# Patient Record
Sex: FEMALE | Race: WHITE | NOT HISPANIC OR LATINO | Employment: OTHER | URBAN - METROPOLITAN AREA
[De-identification: names, ages, dates, MRNs, and addresses within clinical notes are randomized per-mention and may not be internally consistent; named-entity substitution may affect disease eponyms.]

---

## 2017-02-05 ENCOUNTER — APPOINTMENT (EMERGENCY)
Dept: RADIOLOGY | Facility: HOSPITAL | Age: 37
End: 2017-02-05
Payer: MEDICARE

## 2017-02-05 ENCOUNTER — HOSPITAL ENCOUNTER (EMERGENCY)
Facility: HOSPITAL | Age: 37
Discharge: HOME | End: 2017-02-05
Admitting: EMERGENCY MEDICINE
Payer: MEDICARE

## 2017-02-05 VITALS
WEIGHT: 180 LBS | DIASTOLIC BLOOD PRESSURE: 62 MMHG | TEMPERATURE: 97 F | SYSTOLIC BLOOD PRESSURE: 132 MMHG | BODY MASS INDEX: 35.34 KG/M2 | HEART RATE: 88 BPM | HEIGHT: 60 IN | RESPIRATION RATE: 16 BRPM | OXYGEN SATURATION: 99 %

## 2017-02-05 DIAGNOSIS — B34.9 VIRAL ILLNESS: ICD-10-CM

## 2017-02-05 DIAGNOSIS — R05.9 COUGH: Primary | ICD-10-CM

## 2017-02-05 DIAGNOSIS — S29.019A ACUTE THORACIC MYOFASCIAL STRAIN: ICD-10-CM

## 2017-02-05 PROCEDURE — 71020 HB CHEST X-RAY 2VW FRONTAL&LATL: CPT

## 2017-02-05 PROCEDURE — 99283 EMERGENCY DEPT VISIT LOW MDM: CPT

## 2017-06-06 ENCOUNTER — TRANSCRIBE ORDERS (OUTPATIENT)
Dept: ADMINISTRATIVE | Facility: HOSPITAL | Age: 37
End: 2017-06-06

## 2017-06-06 DIAGNOSIS — M54.16 LUMBAR RADICULOPATHY: Primary | ICD-10-CM

## 2017-06-09 ENCOUNTER — HOSPITAL ENCOUNTER (OUTPATIENT)
Dept: CT IMAGING | Facility: CLINIC | Age: 37
Discharge: HOME | End: 2017-06-09
Payer: MEDICARE

## 2017-06-09 DIAGNOSIS — M54.16 LUMBAR RADICULOPATHY: ICD-10-CM

## 2017-06-09 PROCEDURE — 72131 CT LUMBAR SPINE W/O DYE: CPT

## 2017-07-22 ENCOUNTER — ANESTHESIA (INPATIENT)
Dept: PERIOP | Facility: HOSPITAL | Age: 37
DRG: 777 | End: 2017-07-22
Payer: MEDICARE

## 2017-07-22 ENCOUNTER — HOSPITAL ENCOUNTER (INPATIENT)
Facility: HOSPITAL | Age: 37
LOS: 1 days | Discharge: HOME | DRG: 777 | End: 2017-07-23
Attending: OBSTETRICS & GYNECOLOGY | Admitting: OBSTETRICS & GYNECOLOGY
Payer: MEDICARE

## 2017-07-22 ENCOUNTER — APPOINTMENT (EMERGENCY)
Dept: ULTRASOUND IMAGING | Facility: HOSPITAL | Age: 37
DRG: 777 | End: 2017-07-22
Payer: MEDICARE

## 2017-07-22 ENCOUNTER — ANESTHESIA EVENT (INPATIENT)
Dept: PERIOP | Facility: HOSPITAL | Age: 37
DRG: 777 | End: 2017-07-22
Payer: MEDICARE

## 2017-07-22 DIAGNOSIS — O00.90 ECTOPIC PREGNANCY: Primary | ICD-10-CM

## 2017-07-22 LAB
ABO GROUP BLD: NORMAL
ALBUMIN SERPL BCP-MCNC: 3.3 G/DL (ref 3.5–5)
ALP SERPL-CCNC: 67 U/L (ref 46–116)
ALT SERPL W P-5'-P-CCNC: 24 U/L (ref 12–78)
ANION GAP SERPL CALCULATED.3IONS-SCNC: 8 MMOL/L (ref 4–13)
AST SERPL W P-5'-P-CCNC: 17 U/L (ref 5–45)
B-HCG SERPL-ACNC: ABNORMAL MIU/ML
BASOPHILS # BLD AUTO: 0.02 THOUSANDS/ΜL (ref 0–0.1)
BASOPHILS NFR BLD AUTO: 0 % (ref 0–1)
BILIRUB SERPL-MCNC: 0.4 MG/DL (ref 0.2–1)
BUN SERPL-MCNC: 10 MG/DL (ref 5–25)
CALCIUM SERPL-MCNC: 8.6 MG/DL (ref 8.3–10.1)
CHLORIDE SERPL-SCNC: 104 MMOL/L (ref 100–108)
CO2 SERPL-SCNC: 27 MMOL/L (ref 21–32)
CREAT SERPL-MCNC: 0.88 MG/DL (ref 0.6–1.3)
EOSINOPHIL # BLD AUTO: 0 THOUSAND/ΜL (ref 0–0.61)
EOSINOPHIL NFR BLD AUTO: 0 % (ref 0–6)
ERYTHROCYTE [DISTWIDTH] IN BLOOD BY AUTOMATED COUNT: 11.7 % (ref 11.6–15.1)
GFR SERPL CREATININE-BSD FRML MDRD: >60 ML/MIN/1.73SQ M
GLUCOSE SERPL-MCNC: 108 MG/DL (ref 65–140)
HCT VFR BLD AUTO: 36.5 % (ref 34.8–46.1)
HGB BLD-MCNC: 13.1 G/DL (ref 11.5–15.4)
LYMPHOCYTES # BLD AUTO: 1.08 THOUSANDS/ΜL (ref 0.6–4.47)
LYMPHOCYTES NFR BLD AUTO: 6 % (ref 14–44)
MCH RBC QN AUTO: 33 PG (ref 26.8–34.3)
MCHC RBC AUTO-ENTMCNC: 35.9 G/DL (ref 31.4–37.4)
MCV RBC AUTO: 92 FL (ref 82–98)
MONOCYTES # BLD AUTO: 1.42 THOUSAND/ΜL (ref 0.17–1.22)
MONOCYTES NFR BLD AUTO: 8 % (ref 4–12)
NEUTROPHILS # BLD AUTO: 15.62 THOUSANDS/ΜL (ref 1.85–7.62)
NEUTS SEG NFR BLD AUTO: 86 % (ref 43–75)
PLATELET # BLD AUTO: 238 THOUSANDS/UL (ref 149–390)
PMV BLD AUTO: 8.8 FL (ref 8.9–12.7)
POTASSIUM SERPL-SCNC: 3.7 MMOL/L (ref 3.5–5.3)
PROT SERPL-MCNC: 7.1 G/DL (ref 6.4–8.2)
RBC # BLD AUTO: 3.97 MILLION/UL (ref 3.81–5.12)
RH BLD: POSITIVE
SODIUM SERPL-SCNC: 139 MMOL/L (ref 136–145)
WBC # BLD AUTO: 18.14 THOUSAND/UL (ref 4.31–10.16)

## 2017-07-22 PROCEDURE — 87491 CHLMYD TRACH DNA AMP PROBE: CPT | Performed by: PHYSICIAN ASSISTANT

## 2017-07-22 PROCEDURE — 76801 OB US < 14 WKS SINGLE FETUS: CPT

## 2017-07-22 PROCEDURE — 96361 HYDRATE IV INFUSION ADD-ON: CPT

## 2017-07-22 PROCEDURE — 84702 CHORIONIC GONADOTROPIN TEST: CPT | Performed by: PHYSICIAN ASSISTANT

## 2017-07-22 PROCEDURE — 87070 CULTURE OTHR SPECIMN AEROBIC: CPT | Performed by: PHYSICIAN ASSISTANT

## 2017-07-22 PROCEDURE — 10T24ZZ RESECTION OF PRODUCTS OF CONCEPTION, ECTOPIC, PERCUTANEOUS ENDOSCOPIC APPROACH: ICD-10-PCS | Performed by: OBSTETRICS & GYNECOLOGY

## 2017-07-22 PROCEDURE — 96374 THER/PROPH/DIAG INJ IV PUSH: CPT

## 2017-07-22 PROCEDURE — 86901 BLOOD TYPING SEROLOGIC RH(D): CPT | Performed by: PHYSICIAN ASSISTANT

## 2017-07-22 PROCEDURE — 86901 BLOOD TYPING SEROLOGIC RH(D): CPT | Performed by: EMERGENCY MEDICINE

## 2017-07-22 PROCEDURE — 86920 COMPATIBILITY TEST SPIN: CPT

## 2017-07-22 PROCEDURE — 86900 BLOOD TYPING SEROLOGIC ABO: CPT | Performed by: PHYSICIAN ASSISTANT

## 2017-07-22 PROCEDURE — 87147 CULTURE TYPE IMMUNOLOGIC: CPT | Performed by: PHYSICIAN ASSISTANT

## 2017-07-22 PROCEDURE — 36415 COLL VENOUS BLD VENIPUNCTURE: CPT | Performed by: PHYSICIAN ASSISTANT

## 2017-07-22 PROCEDURE — 99285 EMERGENCY DEPT VISIT HI MDM: CPT

## 2017-07-22 PROCEDURE — 86850 RBC ANTIBODY SCREEN: CPT | Performed by: EMERGENCY MEDICINE

## 2017-07-22 PROCEDURE — 85025 COMPLETE CBC W/AUTO DIFF WBC: CPT | Performed by: PHYSICIAN ASSISTANT

## 2017-07-22 PROCEDURE — 80053 COMPREHEN METABOLIC PANEL: CPT | Performed by: PHYSICIAN ASSISTANT

## 2017-07-22 PROCEDURE — 0UT74ZZ RESECTION OF BILATERAL FALLOPIAN TUBES, PERCUTANEOUS ENDOSCOPIC APPROACH: ICD-10-PCS | Performed by: OBSTETRICS & GYNECOLOGY

## 2017-07-22 PROCEDURE — 87591 N.GONORRHOEAE DNA AMP PROB: CPT | Performed by: PHYSICIAN ASSISTANT

## 2017-07-22 PROCEDURE — 86900 BLOOD TYPING SEROLOGIC ABO: CPT | Performed by: EMERGENCY MEDICINE

## 2017-07-22 RX ORDER — FENTANYL CITRATE 50 UG/ML
INJECTION, SOLUTION INTRAMUSCULAR; INTRAVENOUS AS NEEDED
Status: DISCONTINUED | OUTPATIENT
Start: 2017-07-22 | End: 2017-07-23 | Stop reason: SURG

## 2017-07-22 RX ORDER — ACETAMINOPHEN 500 MG
1000 TABLET ORAL EVERY 6 HOURS PRN
COMMUNITY
End: 2019-02-06 | Stop reason: ALTCHOICE

## 2017-07-22 RX ORDER — MORPHINE SULFATE 4 MG/ML
4 INJECTION, SOLUTION INTRAMUSCULAR; INTRAVENOUS ONCE
Status: COMPLETED | OUTPATIENT
Start: 2017-07-22 | End: 2017-07-22

## 2017-07-22 RX ORDER — ROCURONIUM BROMIDE 10 MG/ML
INJECTION, SOLUTION INTRAVENOUS AS NEEDED
Status: DISCONTINUED | OUTPATIENT
Start: 2017-07-22 | End: 2017-07-23 | Stop reason: SURG

## 2017-07-22 RX ORDER — ONDANSETRON 2 MG/ML
INJECTION INTRAMUSCULAR; INTRAVENOUS AS NEEDED
Status: DISCONTINUED | OUTPATIENT
Start: 2017-07-22 | End: 2017-07-23 | Stop reason: SURG

## 2017-07-22 RX ORDER — SODIUM CHLORIDE, SODIUM LACTATE, POTASSIUM CHLORIDE, CALCIUM CHLORIDE 600; 310; 30; 20 MG/100ML; MG/100ML; MG/100ML; MG/100ML
INJECTION, SOLUTION INTRAVENOUS CONTINUOUS PRN
Status: DISCONTINUED | OUTPATIENT
Start: 2017-07-22 | End: 2017-07-23 | Stop reason: SURG

## 2017-07-22 RX ORDER — FENTANYL CITRATE 50 UG/ML
INJECTION, SOLUTION INTRAMUSCULAR; INTRAVENOUS AS NEEDED
Status: DISCONTINUED | OUTPATIENT
Start: 2017-07-22 | End: 2017-07-22

## 2017-07-22 RX ORDER — ACETAMINOPHEN 325 MG/1
650 TABLET ORAL ONCE
Status: DISCONTINUED | OUTPATIENT
Start: 2017-07-22 | End: 2017-07-23

## 2017-07-22 RX ORDER — PROPOFOL 10 MG/ML
INJECTION, EMULSION INTRAVENOUS AS NEEDED
Status: DISCONTINUED | OUTPATIENT
Start: 2017-07-22 | End: 2017-07-23 | Stop reason: SURG

## 2017-07-22 RX ORDER — MIDAZOLAM HYDROCHLORIDE 1 MG/ML
INJECTION INTRAMUSCULAR; INTRAVENOUS AS NEEDED
Status: DISCONTINUED | OUTPATIENT
Start: 2017-07-22 | End: 2017-07-23 | Stop reason: SURG

## 2017-07-22 RX ADMIN — FENTANYL CITRATE 50 MCG: 50 INJECTION, SOLUTION INTRAMUSCULAR; INTRAVENOUS at 23:13

## 2017-07-22 RX ADMIN — SODIUM CHLORIDE 1000 ML: 0.9 INJECTION, SOLUTION INTRAVENOUS at 21:30

## 2017-07-22 RX ADMIN — MIDAZOLAM HYDROCHLORIDE 1 MG: 1 INJECTION, SOLUTION INTRAMUSCULAR; INTRAVENOUS at 23:13

## 2017-07-22 RX ADMIN — ROCURONIUM BROMIDE 35 MG: 10 INJECTION, SOLUTION INTRAVENOUS at 23:29

## 2017-07-22 RX ADMIN — FENTANYL CITRATE 50 MCG: 50 INJECTION, SOLUTION INTRAMUSCULAR; INTRAVENOUS at 23:26

## 2017-07-22 RX ADMIN — PROPOFOL 200 MG: 10 INJECTION, EMULSION INTRAVENOUS at 23:32

## 2017-07-22 RX ADMIN — MIDAZOLAM HYDROCHLORIDE 1 MG: 1 INJECTION, SOLUTION INTRAMUSCULAR; INTRAVENOUS at 23:26

## 2017-07-22 RX ADMIN — ONDANSETRON 4 MG: 2 INJECTION INTRAMUSCULAR; INTRAVENOUS at 23:52

## 2017-07-22 RX ADMIN — SODIUM CHLORIDE 1000 ML: 0.9 INJECTION, SOLUTION INTRAVENOUS at 20:04

## 2017-07-22 RX ADMIN — SODIUM CHLORIDE, SODIUM LACTATE, POTASSIUM CHLORIDE, AND CALCIUM CHLORIDE: .6; .31; .03; .02 INJECTION, SOLUTION INTRAVENOUS at 23:12

## 2017-07-22 RX ADMIN — MORPHINE SULFATE 4 MG: 4 INJECTION, SOLUTION INTRAMUSCULAR; INTRAVENOUS at 21:27

## 2017-07-23 VITALS
BODY MASS INDEX: 42.33 KG/M2 | OXYGEN SATURATION: 100 % | RESPIRATION RATE: 18 BRPM | WEIGHT: 215.61 LBS | SYSTOLIC BLOOD PRESSURE: 95 MMHG | HEART RATE: 73 BPM | DIASTOLIC BLOOD PRESSURE: 53 MMHG | HEIGHT: 60 IN | TEMPERATURE: 98 F

## 2017-07-23 LAB
ABO GROUP BLD: NORMAL
BLD GP AB SCN SERPL QL: NEGATIVE
RH BLD: POSITIVE
SPECIMEN EXPIRATION DATE: NORMAL

## 2017-07-23 PROCEDURE — 88342 IMHCHEM/IMCYTCHM 1ST ANTB: CPT | Performed by: OBSTETRICS & GYNECOLOGY

## 2017-07-23 PROCEDURE — 88305 TISSUE EXAM BY PATHOLOGIST: CPT | Performed by: OBSTETRICS & GYNECOLOGY

## 2017-07-23 RX ORDER — OXYCODONE HYDROCHLORIDE AND ACETAMINOPHEN 5; 325 MG/1; MG/1
1 TABLET ORAL EVERY 4 HOURS PRN
Qty: 30 TABLET | Refills: 0 | Status: SHIPPED | OUTPATIENT
Start: 2017-07-23 | End: 2017-07-28

## 2017-07-23 RX ORDER — GLYCOPYRROLATE 0.2 MG/ML
INJECTION INTRAMUSCULAR; INTRAVENOUS AS NEEDED
Status: DISCONTINUED | OUTPATIENT
Start: 2017-07-23 | End: 2017-07-23 | Stop reason: SURG

## 2017-07-23 RX ORDER — OXYCODONE HYDROCHLORIDE AND ACETAMINOPHEN 5; 325 MG/1; MG/1
1 TABLET ORAL EVERY 4 HOURS PRN
Status: DISCONTINUED | OUTPATIENT
Start: 2017-07-23 | End: 2017-07-23

## 2017-07-23 RX ORDER — SODIUM CHLORIDE 9 MG/ML
INJECTION, SOLUTION INTRAVENOUS AS NEEDED
Status: DISCONTINUED | OUTPATIENT
Start: 2017-07-22 | End: 2017-07-23 | Stop reason: HOSPADM

## 2017-07-23 RX ORDER — FENTANYL CITRATE/PF 50 MCG/ML
25 SYRINGE (ML) INJECTION
Status: DISCONTINUED | OUTPATIENT
Start: 2017-07-23 | End: 2017-07-23 | Stop reason: HOSPADM

## 2017-07-23 RX ORDER — METOCLOPRAMIDE HYDROCHLORIDE 5 MG/ML
10 INJECTION INTRAMUSCULAR; INTRAVENOUS ONCE AS NEEDED
Status: DISCONTINUED | OUTPATIENT
Start: 2017-07-23 | End: 2017-07-23 | Stop reason: HOSPADM

## 2017-07-23 RX ADMIN — HYDROMORPHONE HYDROCHLORIDE 0.5 MG: 1 INJECTION, SOLUTION INTRAMUSCULAR; INTRAVENOUS; SUBCUTANEOUS at 01:24

## 2017-07-23 RX ADMIN — HYDROMORPHONE HYDROCHLORIDE 0.5 MG: 1 INJECTION, SOLUTION INTRAMUSCULAR; INTRAVENOUS; SUBCUTANEOUS at 01:04

## 2017-07-23 RX ADMIN — GLYCOPYRROLATE 0.6 MG: 0.2 INJECTION, SOLUTION INTRAMUSCULAR; INTRAVENOUS at 00:30

## 2017-07-23 RX ADMIN — FENTANYL CITRATE 25 MCG: 50 INJECTION, SOLUTION INTRAMUSCULAR; INTRAVENOUS at 01:20

## 2017-07-23 RX ADMIN — NEOSTIGMINE METHYLSULFATE 3 MG: 1 INJECTION, SOLUTION INTRAMUSCULAR; INTRAVENOUS; SUBCUTANEOUS at 00:30

## 2017-07-23 RX ADMIN — OXYCODONE HYDROCHLORIDE AND ACETAMINOPHEN 1 TABLET: 5; 325 TABLET ORAL at 04:35

## 2017-07-23 RX ADMIN — FENTANYL CITRATE 25 MCG: 50 INJECTION, SOLUTION INTRAMUSCULAR; INTRAVENOUS at 01:13

## 2017-07-23 RX ADMIN — HYDROMORPHONE HYDROCHLORIDE 0.5 MG: 1 INJECTION, SOLUTION INTRAMUSCULAR; INTRAVENOUS; SUBCUTANEOUS at 01:10

## 2017-07-23 RX ADMIN — FENTANYL CITRATE 25 MCG: 50 INJECTION, SOLUTION INTRAMUSCULAR; INTRAVENOUS at 00:58

## 2017-07-23 RX ADMIN — SODIUM CHLORIDE, SODIUM LACTATE, POTASSIUM CHLORIDE, AND CALCIUM CHLORIDE: .6; .31; .03; .02 INJECTION, SOLUTION INTRAVENOUS at 00:48

## 2017-07-23 RX ADMIN — FENTANYL CITRATE 25 MCG: 50 INJECTION, SOLUTION INTRAMUSCULAR; INTRAVENOUS at 00:53

## 2017-07-24 LAB
ABO GROUP BLD BPU: NORMAL
ABO GROUP BLD BPU: NORMAL
BPU ID: NORMAL
BPU ID: NORMAL
CHLAMYDIA DNA CVX QL NAA+PROBE: NORMAL
CROSSMATCH: NORMAL
CROSSMATCH: NORMAL
N GONORRHOEA DNA GENITAL QL NAA+PROBE: NORMAL
UNIT DISPENSE STATUS: NORMAL
UNIT DISPENSE STATUS: NORMAL
UNIT PRODUCT CODE: NORMAL
UNIT PRODUCT CODE: NORMAL
UNIT RH: NORMAL
UNIT RH: NORMAL

## 2017-07-25 LAB
BACTERIA GENITAL AEROBE CULT: NORMAL
BACTERIA GENITAL AEROBE CULT: NORMAL

## 2017-08-03 ENCOUNTER — ALLSCRIPTS OFFICE VISIT (OUTPATIENT)
Dept: OTHER | Facility: OTHER | Age: 37
End: 2017-08-03

## 2018-01-10 NOTE — MISCELLANEOUS
Message     Date: 25 Oct 2016 12:31 PM EST, Recorded By: Nixon Nguyen For: Alexis Stern: Dustin Lopez, Self   Phone: [phone] (Home), [phone] x,,,,, (Work)   Reason: Medical Complaint   Patient called c/o bleeding on and off x 1 week with clots and cramps, pelvic pain  Taking Percocet for back pain  Per Dr Eagle Marrero, could be from ruptured ovarian cyst   If ok with pain Md take Motrin instead  Schedule appt for u/s and ov  Pt cannot come in until 11/2/16  Active Problems    1  Anterior pleuritic pain (786 52) (R07 81)   2  Anxiety disorder (300 00) (F41 9)   3  Breast mass (611 72) (N63)   4  Breast pain (611 71) (N64 4)   5  Cervical cancer screening (V76 2) (Z12 4)   6  Cervicalgia (723 1) (M54 2)   7  Chest pain (786 50) (R07 9)   8  Chronic pain syndrome (338 4) (G89 4)   9  Contraceptive surveillance (V25 40) (Z30 40)   10  Contraceptives (V25 02)   11  Cyst of right ovary (620 2) (N83 201)   12  Depo-Provera contraceptive status (V25 49) (Z30 40)   13  History of Depo-Provera contraceptive status (V25 49) (Z30 40)   14  Disc degeneration, lumbar (722 52) (M51 36)   15  Fatigue (780 79) (R53 83)   16  Female pelvic pain (625 9) (R10 2)   17  Hemoptysis (786 30) (R04 2)   18  History of self breast exam   19  Irregular menses (626 4) (N92 6)   20  LLQ pain (789 04) (R10 32)   21  Lower back pain (724 2) (M54 5)   22  Lumbar canal stenosis (724 02) (M48 06)   23  Lumbar radiculopathy (724 4) (M54 16)   24  Myalgia And Myositis (729 1)   25  Postlaminectomy syndrome, lumbar (722 83) (M96 1)   26  Spinal stenosis (724 00) (M48 00)   27  Thoracic back pain (724 1) (M54 6)   28  Visit for routine gyn exam (V72 31) (Z01 419)    Current Meds   1  ClonazePAM 1 MG Oral Tablet; TAKE 1 TABLET TWICE DAILY AS NEEDED; Therapy: 66VZE7608 to (Evaluate:22Nov2014); Last Rx:85Rpw4173 Ordered   2  Fioricet -40 MG TABS (Butalbital-APAP-Caffeine);    Therapy: (Recorded:08Apr2016) to Recorded   3  Hydrocodone-Acetaminophen 5-325 MG Oral Tablet; Therapy: 64FCY7231 to Recorded   4  MedroxyPROGESTERone Acetate 150 MG/ML Intramuscular Suspension   (Depo-Provera); INJECT EVERY 12 WEEKS AS DIRECTED; Therapy: 00IID2375-  Requested for: 07ALW0593; Last Rx:08Mar2016; Status:   NEED INFORMATION - Billable Problem Ordered   5  MedroxyPROGESTERone Acetate 150 MG/ML Intramuscular Suspension; INJECT   INTRAMUSCULARLY EVERY 12 WEEKS AS DIRECTED; Therapy: 49MID9148 to (Last Rx:06Jun2016)  Requested for: 06Jun2016   Ordered    Allergies    1  Aztreonam   2  Carbapenems   3  Cephalosporins   4   Griseofulvin    Signatures   Electronically signed by : Rafael Robles, ; Oct 25 2016 12:34PM EST                       (Author)

## 2018-01-11 NOTE — MISCELLANEOUS
Message   Recorded as Task   Date: 06/09/2016 10:21 PM, Created By: Valentin Onofre   Task Name: Go to Result   Assigned To: Valentin Onofre   Regarding Patient: LINNEA HUNTER, Status: Active   Comment:    Valentin Onofre - 09 Jun 2016 10:21 PM     TASK CREATED  cyst still present, would recheck in 3 months   Rhona Mills - 10 Randall 2016 7:37 AM     TASK REPLIED TO: Previously Assigned To Brittany Thurman  sent pt a new order for 3 month follow-up        Active Problems    1  Anterior pleuritic pain (786 52) (R07 81)   2  Anxiety disorder (300 00) (F41 9)   3  Breast mass (611 72) (N63)   4  Breast pain (611 71) (N64 4)   5  Cervical cancer screening (V76 2) (Z12 4)   6  Cervicalgia (723 1) (M54 2)   7  Chest pain (786 50) (R07 9)   8  Chronic pain syndrome (338 4) (G89 4)   9  Contraceptive surveillance (V25 40) (Z30 40)   10  Contraceptives (V25 02)   11  Cyst of right ovary (620 2) (N83 20)   12  Depo-Provera contraceptive status (V25 49) (Z30 42)   13  History of Depo-Provera contraceptive status (V25 49) (Z30 42)   14  Disc degeneration, lumbar (722 52) (M51 36)   15  Fatigue (780 79) (R53 83)   16  Female pelvic pain (625 9) (R10 2)   17  Hemoptysis (786 30) (R04 2)   18  History of self breast exam   19  Irregular menses (626 4) (N92 6)   20  LLQ pain (789 04) (R10 32)   21  Lower back pain (724 2) (M54 5)   22  Lumbar canal stenosis (724 02) (M48 06)   23  Lumbar radiculopathy (724 4) (M54 16)   24  Myalgia And Myositis (729 1)   25  Postlaminectomy syndrome, lumbar (722 83) (M96 1)   26  Spinal stenosis (724 00) (M48 00)   27  Thoracic back pain (724 1) (M54 6)   28  Visit for routine gyn exam (V72 31) (Z01 419)    Current Meds   1  ALPRAZolam 0 25 MG Oral Tablet; Therapy: 45RUS0105 to Recorded   2  ClonazePAM 1 MG Oral Tablet; TAKE 1 TABLET TWICE DAILY AS NEEDED; Therapy: 74RNW9907 to (Evaluate:22Nov2014); Last Rx:78Uuz8351 Ordered   3  Diclofenac Potassium 50 MG Oral Tablet;    Therapy: 30YYU4905 to Recorded   4  Doxepin HCl - 10 MG Oral Capsule; Therapy: 21BTZ5492 to Recorded   5  Fioricet -40 MG TABS (APAP-Butalbital-Caffeine); Therapy: (Recorded:08Apr2016) to Recorded   6  Flexeril 5 MG TABS (Cyclobenzaprine HCl); Therapy: (Recorded:30Mar2015) to Recorded   7  Hydrocodone-Acetaminophen 5-325 MG Oral Tablet; Therapy: 59MYS6269 to Recorded   8  MedroxyPROGESTERone Acetate 150 MG/ML Intramuscular Suspension   (Depo-Provera); INJECT EVERY 12 WEEKS AS DIRECTED; Therapy: 86KZK8077-  Requested for: 04LBZ3839; Last Rx:08Mar2016; Status:   NEED INFORMATION - Billable Problem Ordered   9  MedroxyPROGESTERone Acetate 150 MG/ML Intramuscular Suspension; INJECT   INTRAMUSCULARLY EVERY 12 WEEKS AS DIRECTED; Therapy: 09DVF6673 to (Last Rx:06Jun2016)  Requested for: 06Jun2016   Ordered   10  Morphine Sulfate TABS; Therapy: (Recorded:08Mar2016) to Recorded    Allergies    1  Aztreonam   2  Carbapenems   3  Cephalosporins   4  Griseofulvin    Plan  Cyst of right ovary    · * US PELVIS COMPLETE (TRANSABDOMINAL AND TRANSVAGINAL); Status:Hold For -  [employer];  Requested for:48Nbv7938;     Signatures   Electronically signed by : Tosha Blackmon, ; Randall 10 2016  7:38AM EST                       (Author)

## 2018-01-14 VITALS
SYSTOLIC BLOOD PRESSURE: 122 MMHG | WEIGHT: 194.38 LBS | DIASTOLIC BLOOD PRESSURE: 84 MMHG | BODY MASS INDEX: 38.16 KG/M2 | HEIGHT: 60 IN

## 2018-01-14 NOTE — MISCELLANEOUS
Message   Recorded as Task   Date: 04/07/2016 02:47 PM, Created By: Kike Coon   Task Name: Go to Result   Assigned To: Danielle Presume   Regarding Patient: LINNEA HUNTER, Status: In Progress   Comment:    Kike Coon - 07 Apr 2016 2:47 PM     TASK CREATED  US shows a cyst on the right side, next to the ovary, left side and uterus are normal   rec repeat US in 8-12 weeks   Rhona Mills - 07 Apr 2016 4:28 PM     TASK IN PROGRESS    Pt informed  Order mailed for follow up  Active Problems    1  Anxiety disorder (300 00) (F41 9)   2  Breast mass (611 72) (N63)   3  Breast pain (611 71) (N64 4)   4  Cervical cancer screening (V76 2) (Z12 4)   5  Cervicalgia (723 1) (M54 2)   6  Chest pain (786 50) (R07 9)   7  Chronic pain syndrome (338 4) (G89 4)   8  Contraceptive surveillance (V25 40) (Z30 40)   9  Contraceptives (V25 02)   10  Cyst of right ovary (620 2) (N83 20)   11  Depo-Provera contraceptive status (V25 49) (Z30 42)   12  History of Depo-Provera contraceptive status (V25 49) (Z30 42)   13  Disc degeneration, lumbar (722 52) (M51 36)   14  Fatigue (780 79) (R53 83)   15  Female pelvic pain (625 9) (R10 2)   16  History of self breast exam   17  Irregular menses (626 4) (N92 6)   18  LLQ pain (789 04) (R10 32)   19  Lower back pain (724 2) (M54 5)   20  Lumbar canal stenosis (724 02) (M48 06)   21  Lumbar radiculopathy (724 4) (M54 16)   22  Myalgia And Myositis (729 1)   23  Postlaminectomy syndrome, lumbar (722 83) (M96 1)   24  Spinal stenosis (724 00) (M48 00)   25  Thoracic back pain (724 1) (M54 6)   26  Visit for routine gyn exam (V72 31) (Z01 419)    Current Meds   1  ALPRAZolam 0 25 MG Oral Tablet; Therapy: 78HLX5056 to Recorded   2  ClonazePAM 1 MG Oral Tablet; TAKE 1 TABLET TWICE DAILY AS NEEDED; Therapy: 69ERT7397 to (Evaluate:22Nov2014); Last Rx:45Thl8423 Ordered   3  Diclofenac Potassium 50 MG Oral Tablet; Therapy: 89FEG1134 to Recorded   4   Doxepin HCl - 10 MG Oral Capsule; Therapy: 63YFI1842 to Recorded   5  Flexeril 5 MG TABS (Cyclobenzaprine HCl); Therapy: (Recorded:30Mar2015) to Recorded   6  Hydrocodone-Acetaminophen 5-325 MG Oral Tablet; Therapy: 65KLX8237 to Recorded   7  MedroxyPROGESTERone Acetate 150 MG/ML Intramuscular Suspension   (Depo-Provera); INJECT EVERY 12 WEEKS AS DIRECTED; Therapy: 39GTO1211-  Requested for: 54VFT3094; Last Rx:08Mar2016; Status:   NEED INFORMATION - Billable Problem Ordered   8  Morphine Sulfate TABS; Therapy: (Recorded:08Mar2016) to Recorded    Allergies    1  Aztreonam   2  Carbapenems   3  Cephalosporins   4  Griseofulvin    Plan  Cyst of right ovary    · US PELVIS COMPLETE NON OB; Status:Hold For - Scheduling,Retrospective  Authorization;  Requested for:08Apr2016;     Signatures   Electronically signed by : Hira Melgar, ; Apr 8 2016  8:21AM EST                       (Author)

## 2018-01-16 NOTE — MISCELLANEOUS
Message   Recorded as Task   Date: 08/18/2016 03:17 PM, Created By: Sabianist Shannanramos   Task Name: Go to Result   Assigned To: Marychuy Urrutia   Regarding Patient: LINNEA HUNTER, Status: In Progress   Comment:    [employer] - 18 Aug 2016 3:17 PM     TASK CREATED  slightly smaller right ovarian cyst/nodule, would recheck with US in 3-4 months   Rhona Mills - 18 Aug 2016 3:37 PM     TASK IN PROGRESS   Rhona Mills - 18 Aug 2016 3:45 PM     TASK REPLIED TO: Previously Assigned To Marychuy Urrutia  pt does have pain with the cyst and it causes her discomfort and it gets like a #10 on the scale of pain    she wants to know what to do? [employer] - 19 Aug 2016 1:22 PM     TASK REPLIED TO: Previously Assigned To Sabianist Fuelramos  her cyst is very small, not sure this is the cause of her pain, she can come in for evaluation        Active Problems    1  Anterior pleuritic pain (786 52) (R07 81)   2  Anxiety disorder (300 00) (F41 9)   3  Breast mass (611 72) (N63)   4  Breast pain (611 71) (N64 4)   5  Cervical cancer screening (V76 2) (Z12 4)   6  Cervicalgia (723 1) (M54 2)   7  Chest pain (786 50) (R07 9)   8  Chronic pain syndrome (338 4) (G89 4)   9  Contraceptive surveillance (V25 40) (Z30 40)   10  Contraceptives (V25 02)   11  Cyst of right ovary (620 2) (N83 20)   12  Depo-Provera contraceptive status (V25 49) (Z30 42)   13  History of Depo-Provera contraceptive status (V25 49) (Z30 42)   14  Disc degeneration, lumbar (722 52) (M51 36)   15  Fatigue (780 79) (R53 83)   16  Female pelvic pain (625 9) (R10 2)   17  Hemoptysis (786 30) (R04 2)   18  History of self breast exam   19  Irregular menses (626 4) (N92 6)   20  LLQ pain (789 04) (R10 32)   21  Lower back pain (724 2) (M54 5)   22  Lumbar canal stenosis (724 02) (M48 06)   23  Lumbar radiculopathy (724 4) (M54 16)   24  Myalgia And Myositis (729 1)   25  Postlaminectomy syndrome, lumbar (722 83) (M96 1)   26   Spinal stenosis (724 00) (M48 00)   27  Thoracic back pain (724 1) (M54 6)   28  Visit for routine gyn exam (V72 31) (Z01 419)    Current Meds   1  ClonazePAM 1 MG Oral Tablet; TAKE 1 TABLET TWICE DAILY AS NEEDED; Therapy: 84EZB1656 to (Evaluate:22Nov2014); Last Rx:58Kap2444 Ordered   2  Fioricet -40 MG TABS (Butalbital-APAP-Caffeine); Therapy: (Recorded:08Apr2016) to Recorded   3  Hydrocodone-Acetaminophen 5-325 MG Oral Tablet; Therapy: 23AFG4878 to Recorded   4  MedroxyPROGESTERone Acetate 150 MG/ML Intramuscular Suspension   (Depo-Provera); INJECT EVERY 12 WEEKS AS DIRECTED; Therapy: 74EMW4974-  Requested for: 18WLN4456; Last Rx:08Mar2016; Status:   NEED INFORMATION - Billable Problem Ordered   5  MedroxyPROGESTERone Acetate 150 MG/ML Intramuscular Suspension; INJECT   INTRAMUSCULARLY EVERY 12 WEEKS AS DIRECTED; Therapy: 90EKI4916 to (Last Rx:06Jun2016)  Requested for: 06Jun2016   Ordered    Allergies    1  Aztreonam   2  Carbapenems   3  Cephalosporins   4   Griseofulvin    Signatures   Electronically signed by : Thomas Pelaez, ; Aug 24 2016  1:56PM EST                       (Author)

## 2018-01-17 NOTE — MISCELLANEOUS
Message   Date: 31 Oct 2016 8:24 AM EST, Recorded By: Stacey Franco For: Lady Subramanian   Caller: Janie Ritchie, Self   Phone: [phone] (Home), [phone] x,,,,, (Work)   Reason: Medical Complaint   pt had more pain over the weekend    she will be seen COREY Caldera Active Problems    1  Anterior pleuritic pain (786 52) (R07 81)   2  Anxiety disorder (300 00) (F41 9)   3  Breast mass (611 72) (N63)   4  Breast pain (611 71) (N64 4)   5  Cervical cancer screening (V76 2) (Z12 4)   6  Cervicalgia (723 1) (M54 2)   7  Chest pain (786 50) (R07 9)   8  Chronic pain syndrome (338 4) (G89 4)   9  Contraceptive surveillance (V25 40) (Z30 40)   10  Contraceptives (V25 02)   11  Cyst of right ovary (620 2) (N83 201)   12  Depo-Provera contraceptive status (V25 49) (Z30 40)   13  History of Depo-Provera contraceptive status (V25 49) (Z30 40)   14  Disc degeneration, lumbar (722 52) (M51 36)   15  Fatigue (780 79) (R53 83)   16  Female pelvic pain (625 9) (R10 2)   17  Hemoptysis (786 30) (R04 2)   18  History of self breast exam   19  Irregular menses (626 4) (N92 6)   20  LLQ pain (789 04) (R10 32)   21  Lower back pain (724 2) (M54 5)   22  Lumbar canal stenosis (724 02) (M48 06)   23  Lumbar radiculopathy (724 4) (M54 16)   24  Myalgia And Myositis (729 1)   25  Postlaminectomy syndrome, lumbar (722 83) (M96 1)   26  Spinal stenosis (724 00) (M48 00)   27  Thoracic back pain (724 1) (M54 6)   28  Visit for routine gyn exam (V72 31) (Z01 419)    Current Meds   1  ClonazePAM 1 MG Oral Tablet; TAKE 1 TABLET TWICE DAILY AS NEEDED; Therapy: 98AYH3055 to (Evaluate:22Nov2014); Last Rx:16Tlx6003 Ordered   2  Fioricet -40 MG TABS (Butalbital-APAP-Caffeine); Therapy: (Recorded:08Apr2016) to Recorded   3  Hydrocodone-Acetaminophen 5-325 MG Oral Tablet; Therapy: 54LSK9632 to Recorded   4   MedroxyPROGESTERone Acetate 150 MG/ML Intramuscular Suspension   (Depo-Provera); INJECT EVERY 12 WEEKS AS DIRECTED; Therapy: 23UCN0320-  Requested for: 10CUT1755; Last Rx:08Mar2016; Status:   NEED INFORMATION - Billable Problem Ordered   5  MedroxyPROGESTERone Acetate 150 MG/ML Intramuscular Suspension; INJECT   INTRAMUSCULARLY EVERY 12 WEEKS AS DIRECTED; Therapy: 75RUG6757 to (Last Rx:06Jun2016)  Requested for: 06Jun2016   Ordered    Allergies    1  Aztreonam   2  Carbapenems   3  Cephalosporins   4   Griseofulvin    Signatures   Electronically signed by : Lanette Kimbrough, ; Oct 31 2016  8:25AM EST                       (Author)

## 2018-01-18 NOTE — MISCELLANEOUS
Message   Date: 04 Aug 2016 1:48 PM EST, Recorded By: Itzel Maharaj For: Ashtyn Palomares   Caller: Veronia Leyden, Self   Phone: [phone] (Home), [phone] x,,,,, (Work)   Reason: Medical Complaint   pt is having pain in left side    she was also complaining about pain in her left breast  pt will schedule appt           Active Problems    1  Anterior pleuritic pain (786 52) (R07 81)   2  Anxiety disorder (300 00) (F41 9)   3  Breast mass (611 72) (N63)   4  Breast pain (611 71) (N64 4)   5  Cervical cancer screening (V76 2) (Z12 4)   6  Cervicalgia (723 1) (M54 2)   7  Chest pain (786 50) (R07 9)   8  Chronic pain syndrome (338 4) (G89 4)   9  Contraceptive surveillance (V25 40) (Z30 40)   10  Contraceptives (V25 02)   11  Cyst of right ovary (620 2) (N83 20)   12  Depo-Provera contraceptive status (V25 49) (Z30 42)   13  History of Depo-Provera contraceptive status (V25 49) (Z30 42)   14  Disc degeneration, lumbar (722 52) (M51 36)   15  Fatigue (780 79) (R53 83)   16  Female pelvic pain (625 9) (R10 2)   17  Hemoptysis (786 30) (R04 2)   18  History of self breast exam   19  Irregular menses (626 4) (N92 6)   20  LLQ pain (789 04) (R10 32)   21  Lower back pain (724 2) (M54 5)   22  Lumbar canal stenosis (724 02) (M48 06)   23  Lumbar radiculopathy (724 4) (M54 16)   24  Myalgia And Myositis (729 1)   25  Postlaminectomy syndrome, lumbar (722 83) (M96 1)   26  Spinal stenosis (724 00) (M48 00)   27  Thoracic back pain (724 1) (M54 6)   28  Visit for routine gyn exam (V72 31) (Z01 419)    Current Meds   1  ALPRAZolam 0 25 MG Oral Tablet; Therapy: 83YLD0302 to Recorded   2  ClonazePAM 1 MG Oral Tablet; TAKE 1 TABLET TWICE DAILY AS NEEDED; Therapy: 16SZP8469 to (Evaluate:22Nov2014); Last Rx:09Ieh7888 Ordered   3  Diclofenac Potassium 50 MG Oral Tablet; Therapy: 38WNO7015 to Recorded   4  Doxepin HCl - 10 MG Oral Capsule; Therapy: 61YUE6247 to Recorded   5   Fioricet -40 MG TABS (Butalbital-APAP-Caffeine); Therapy: (Recorded:08Apr2016) to Recorded   6  Flexeril 5 MG TABS (Cyclobenzaprine HCl); Therapy: (Recorded:30Mar2015) to Recorded   7  Hydrocodone-Acetaminophen 5-325 MG Oral Tablet; Therapy: 44FPG2115 to Recorded   8  MedroxyPROGESTERone Acetate 150 MG/ML Intramuscular Suspension   (Depo-Provera); INJECT EVERY 12 WEEKS AS DIRECTED; Therapy: 09TGF0862-  Requested for: 11FPB1487; Last Rx:08Mar2016; Status:   NEED INFORMATION - Billable Problem Ordered   9  MedroxyPROGESTERone Acetate 150 MG/ML Intramuscular Suspension; INJECT   INTRAMUSCULARLY EVERY 12 WEEKS AS DIRECTED; Therapy: 66UXA0818 to (Last Rx:06Jun2016)  Requested for: 06Jun2016   Ordered   10  Morphine Sulfate TABS; Therapy: (Recorded:08Mar2016) to Recorded    Allergies    1  Aztreonam   2  Carbapenems   3  Cephalosporins   4   Griseofulvin    Signatures   Electronically signed by : Walter Thorne, ; Aug  4 2016  1:49PM EST                       (Author)

## 2018-01-18 NOTE — MISCELLANEOUS
Message   Date: 24 Aug 2016 1:55 PM EST, Recorded By: Alfred David For: Diamond Negro   Caller: Gwen Gonzalez, Self   Phone: [phone] (Home), [phone] x,,,,, (Work)   Reason: Medical Complaint   pt is having breast pain    pt will come in for a breast check           Active Problems    1  Anterior pleuritic pain (786 52) (R07 81)   2  Anxiety disorder (300 00) (F41 9)   3  Breast mass (611 72) (N63)   4  Breast pain (611 71) (N64 4)   5  Cervical cancer screening (V76 2) (Z12 4)   6  Cervicalgia (723 1) (M54 2)   7  Chest pain (786 50) (R07 9)   8  Chronic pain syndrome (338 4) (G89 4)   9  Contraceptive surveillance (V25 40) (Z30 40)   10  Contraceptives (V25 02)   11  Cyst of right ovary (620 2) (N83 20)   12  Depo-Provera contraceptive status (V25 49) (Z30 42)   13  History of Depo-Provera contraceptive status (V25 49) (Z30 42)   14  Disc degeneration, lumbar (722 52) (M51 36)   15  Fatigue (780 79) (R53 83)   16  Female pelvic pain (625 9) (R10 2)   17  Hemoptysis (786 30) (R04 2)   18  History of self breast exam   19  Irregular menses (626 4) (N92 6)   20  LLQ pain (789 04) (R10 32)   21  Lower back pain (724 2) (M54 5)   22  Lumbar canal stenosis (724 02) (M48 06)   23  Lumbar radiculopathy (724 4) (M54 16)   24  Myalgia And Myositis (729 1)   25  Postlaminectomy syndrome, lumbar (722 83) (M96 1)   26  Spinal stenosis (724 00) (M48 00)   27  Thoracic back pain (724 1) (M54 6)   28  Visit for routine gyn exam (V72 31) (Z01 419)    Current Meds   1  ClonazePAM 1 MG Oral Tablet; TAKE 1 TABLET TWICE DAILY AS NEEDED; Therapy: 30MVD0350 to (Evaluate:22Nov2014); Last Rx:92Nyt8175 Ordered   2  Fioricet -40 MG TABS (Butalbital-APAP-Caffeine); Therapy: (Recorded:08Apr2016) to Recorded   3  Hydrocodone-Acetaminophen 5-325 MG Oral Tablet; Therapy: 79OOH3802 to Recorded   4   MedroxyPROGESTERone Acetate 150 MG/ML Intramuscular Suspension   (Depo-Provera); INJECT EVERY 12 WEEKS AS DIRECTED; Therapy: 41TWA7038-  Requested for: 73PIT7220; Last Rx:08Mar2016; Status:   NEED INFORMATION - Billable Problem Ordered   5  MedroxyPROGESTERone Acetate 150 MG/ML Intramuscular Suspension; INJECT   INTRAMUSCULARLY EVERY 12 WEEKS AS DIRECTED; Therapy: 69YXL7712 to (Last Rx:06Jun2016)  Requested for: 06Jun2016   Ordered    Allergies    1  Aztreonam   2  Carbapenems   3  Cephalosporins   4   Griseofulvin    Signatures   Electronically signed by : Thomas Pelaez, ; Aug 24 2016  1:57PM EST                       (Author)

## 2019-02-06 ENCOUNTER — OFFICE VISIT (OUTPATIENT)
Dept: URGENT CARE | Facility: CLINIC | Age: 39
End: 2019-02-06
Payer: COMMERCIAL

## 2019-02-06 VITALS
WEIGHT: 214 LBS | TEMPERATURE: 97.9 F | SYSTOLIC BLOOD PRESSURE: 118 MMHG | HEART RATE: 86 BPM | DIASTOLIC BLOOD PRESSURE: 68 MMHG | RESPIRATION RATE: 16 BRPM | OXYGEN SATURATION: 99 % | HEIGHT: 60 IN | BODY MASS INDEX: 42.01 KG/M2

## 2019-02-06 DIAGNOSIS — R05.9 COUGH: Primary | ICD-10-CM

## 2019-02-06 DIAGNOSIS — L72.3 SEBACEOUS CYST OF LEFT AXILLA: ICD-10-CM

## 2019-02-06 PROCEDURE — 99213 OFFICE O/P EST LOW 20 MIN: CPT | Performed by: EMERGENCY MEDICINE

## 2019-02-06 RX ORDER — CLONAZEPAM 1 MG/1
1 TABLET ORAL DAILY PRN
COMMUNITY

## 2019-02-06 RX ORDER — SULFAMETHOXAZOLE AND TRIMETHOPRIM 800; 160 MG/1; MG/1
1 TABLET ORAL 2 TIMES DAILY
Qty: 14 TABLET | Refills: 0 | Status: SHIPPED | OUTPATIENT
Start: 2019-02-06 | End: 2019-02-13

## 2019-02-06 RX ORDER — BUTALBITAL, ACETAMINOPHEN AND CAFFEINE 50; 325; 40 MG/1; MG/1; MG/1
TABLET ORAL
COMMUNITY

## 2019-02-06 NOTE — PROGRESS NOTES
Assessment/Plan:    No problem-specific Assessment & Plan notes found for this encounter  Diagnoses and all orders for this visit:    Cough    Sebaceous cyst of left axilla  -     sulfamethoxazole-trimethoprim (BACTRIM DS) 800-160 mg per tablet; Take 1 tablet by mouth 2 (two) times a day for 7 days    Other orders  -     clonazePAM (KlonoPIN) 1 mg tablet; Take 1 mg by mouth daily as needed  -     butalbital-acetaminophen-caffeine (FIORICET,ESGIC) -40 mg per tablet; Take by mouth          Subjective:      Patient ID: Michelle Duenas is a 41 y o  female  Pt c/o recent cold, cough, pain on L side of neck, lump in L axilla, pain in lower ribs bilaterally      Cough   This is a new problem  The current episode started in the past 7 days  The problem has been gradually improving  The problem occurs every few minutes  The cough is non-productive  Associated symptoms include chest pain (bilateral ribs)  Nothing aggravates the symptoms  She has tried nothing for the symptoms  The treatment provided no relief  Arm Pain    The incident occurred 5 to 7 days ago  There was no injury mechanism  Pain location: L axilla  The quality of the pain is described as aching  The pain does not radiate  The pain is at a severity of 1/10  The pain is mild  Associated symptoms include chest pain (bilateral ribs)  Nothing aggravates the symptoms  She has tried nothing for the symptoms  The treatment provided moderate relief  The following portions of the patient's history were reviewed and updated as appropriate: current medications, past family history, past medical history, past social history, past surgical history and problem list     Review of Systems   Respiratory: Positive for cough  Cardiovascular: Positive for chest pain (bilateral ribs)  Musculoskeletal: Positive for arthralgias (L axilla)  All other systems reviewed and are negative          Objective:      /68   Pulse 86   Temp 97 9 °F (36 6 °C)   Resp 16   Ht 5' (1 524 m)   Wt 97 1 kg (214 lb)   LMP 01/28/2019   SpO2 99%   Breastfeeding? Unknown   BMI 41 79 kg/m²          Physical Exam   Constitutional: She is oriented to person, place, and time  She appears well-developed and well-nourished  HENT:   Nose: Nose normal    Eyes: Pupils are equal, round, and reactive to light  Neck: Normal range of motion  Cardiovascular: Normal rate  Pulmonary/Chest: Effort normal  She has rales (few, bibasilar)  Abdominal: Soft  Musculoskeletal:        Arms:  Neurological: She is alert and oriented to person, place, and time  Skin: Skin is warm and dry  Psychiatric: She has a normal mood and affect  Her behavior is normal  Judgment and thought content normal    Nursing note and vitals reviewed

## 2019-02-06 NOTE — PATIENT INSTRUCTIONS
Warm compresses to armpit 2-3 x a day, Bactrim twice a day, sleep with head elevated, OTC cough medication as needed, contact PCP about cyst removal

## 2022-11-17 ENCOUNTER — OFFICE VISIT (OUTPATIENT)
Dept: URGENT CARE | Facility: CLINIC | Age: 42
End: 2022-11-17

## 2022-11-17 VITALS — RESPIRATION RATE: 18 BRPM | TEMPERATURE: 97.6 F | OXYGEN SATURATION: 97 % | HEART RATE: 86 BPM

## 2022-11-17 DIAGNOSIS — J01.11 ACUTE RECURRENT FRONTAL SINUSITIS: Primary | ICD-10-CM

## 2022-11-17 RX ORDER — METHYLPREDNISOLONE 4 MG/1
TABLET ORAL
Qty: 21 TABLET | Refills: 0 | Status: SHIPPED | OUTPATIENT
Start: 2022-11-17

## 2022-11-17 RX ORDER — AZITHROMYCIN 250 MG/1
TABLET, FILM COATED ORAL
Qty: 6 TABLET | Refills: 0 | Status: SHIPPED | OUTPATIENT
Start: 2022-11-17 | End: 2022-11-21

## 2022-11-17 NOTE — PROGRESS NOTES
330"[employer]" Now        NAME: Juan R Dennis is a 46 y o  female  : 1980    MRN: [id number]  DATE: 2022  TIME: 6:32 PM    Assessment and Plan   Acute recurrent frontal sinusitis [J01 11]  1  Acute recurrent frontal sinusitis  azithromycin (ZITHROMAX) 250 mg tablet    methylPREDNISolone 4 MG tablet therapy pack            Patient Instructions       Follow up with PCP in 3-5 days  Proceed to  ER if symptoms worsen  Chief Complaint     Chief Complaint   Patient presents with   • Cough     Pt reports having "the flu" on 11/3  Her symptoms resolved except for her productive cough  She has been using OTC medication with no relief  Negative home COVID test           History of Present Illness       51-year-old female with a 2 week history of a dry, nonproductive cough which has been persistent despite using over-the-counter products such as Mucinex and other cough suppressants  She does report a fever of 1022 days ago and does also report increased nasal congestion  Review of Systems   Review of Systems   Constitutional: Positive for fever  HENT: Positive for congestion  Eyes: Negative  Respiratory: Positive for cough  Cardiovascular: Negative  Gastrointestinal: Negative  Endocrine: Negative  Genitourinary: Negative  Musculoskeletal: Negative  Skin: Negative  Allergic/Immunologic: Negative  Neurological: Negative  Hematological: Negative  Psychiatric/Behavioral: Negative            Current Medications       Current Outpatient Medications:   •  azithromycin (ZITHROMAX) 250 mg tablet, Take 2 tablets today then 1 tablet daily x 4 days, Disp: 6 tablet, Rfl: 0  •  methylPREDNISolone 4 MG tablet therapy pack, Use as directed on package, Disp: 21 tablet, Rfl: 0  •  butalbital-acetaminophen-caffeine (FIORICET,ESGIC) -40 mg per tablet, Take by mouth (Patient not taking: Reported on 2022), Disp: , Rfl:   •  clonazePAM (KlonoPIN) 1 mg tablet, Take 1 mg by mouth daily as needed (Patient not taking: Reported on 11/17/2022), Disp: , Rfl:     Current Allergies     Allergies as of 11/17/2022 - Reviewed 10/04/2021   Allergen Reaction Noted   • Penicillins  07/09/2016            The following portions of the patient's history were reviewed and updated as appropriate: allergies, current medications, past family history, past medical history, past social history, past surgical history and problem list      Past Medical History:   Diagnosis Date   • Anxiety    • Chronic back pain    • Chronic pain    • Myalgia    • Ovarian cyst    • Spinal stenosis        Past Surgical History:   Procedure Laterality Date   • LAMINECTOMY     • [place] LAP,DIAGNOSTIC ABDOMEN N/A 7/22/2017    Procedure: LAPAROSCOPY DIAGNOSTIC;  Surgeon: Bhumika Rai MD;  Location: [facility] OR;  Service: Gynecology       No family history on file  Medications have been verified  Objective   Pulse 86   Temp 97 6 °F (36 4 °C)   Resp 18   SpO2 97%   No LMP recorded  Physical Exam     Physical Exam  Vitals and nursing note reviewed  Constitutional:       General: Vital signs are normal       Appearance: She is well-developed  HENT:      Head: Normocephalic  Nose: Congestion present  Eyes:      Pupils: Pupils are equal, round, and reactive to light  Cardiovascular:      Rate and Rhythm: Normal rate and regular rhythm  Pulmonary:      Effort: Pulmonary effort is normal    Musculoskeletal:         General: Normal range of motion  Skin:     General: Skin is warm and dry  Neurological:      Mental Status: She is alert and oriented to person, place, and time     Psychiatric:         Mood and Affect: Mood and affect normal

## 2023-01-16 PROBLEM — J01.11 ACUTE RECURRENT FRONTAL SINUSITIS: Status: RESOLVED | Noted: 2022-11-17 | Resolved: 2023-01-16

## 2023-01-26 ENCOUNTER — PREP FOR PROCEDURE (OUTPATIENT)
Dept: OBGYN CLINIC | Facility: OTHER | Age: 43
End: 2023-01-26

## 2023-01-26 DIAGNOSIS — M79.671 RIGHT FOOT PAIN: Primary | ICD-10-CM

## 2023-02-06 RX ORDER — CYCLOBENZAPRINE HCL 10 MG
10 TABLET ORAL 3 TIMES DAILY PRN
COMMUNITY

## 2023-02-06 RX ORDER — CITALOPRAM 10 MG/1
10 TABLET ORAL DAILY
COMMUNITY

## 2023-02-06 RX ORDER — HYDROXYZINE HYDROCHLORIDE 10 MG/1
10 TABLET, FILM COATED ORAL AS NEEDED
COMMUNITY

## 2023-02-06 NOTE — PRE-PROCEDURE INSTRUCTIONS
Pre-Surgery Instructions:   Medication Instructions   • butalbital-acetaminophen-caffeine (FIORICET,ESGIC) -40 mg per tablet Uses PRN- OK to take day of surgery   • citalopram (CeleXA) 10 mg tablet Take day of surgery  • cyclobenzaprine (FLEXERIL) 10 mg tablet Uses PRN- OK to take day of surgery   • hydrOXYzine HCL (ATARAX) 10 mg tablet Uses PRN- OK to take day of surgery   Pre op and bathing instructions reviewed  Pt has hibiclens  Pt  Verbalized understanding of current visitor restrictions  Pt  Verbalized an understanding of all instructions reviewed and offers no concerns at this time  Instructed to avoid all ASA/NSAIDs and OTC Vit/Supp from now until after surgery per anesthesia guidelines   Tylenol ok prn  DOS meds with a few sips of H20

## 2023-02-08 ENCOUNTER — ANESTHESIA EVENT (OUTPATIENT)
Dept: PERIOP | Facility: HOSPITAL | Age: 43
End: 2023-02-08

## 2023-02-10 ENCOUNTER — ANESTHESIA (OUTPATIENT)
Dept: PERIOP | Facility: HOSPITAL | Age: 43
End: 2023-02-10

## 2023-02-10 ENCOUNTER — HOSPITAL ENCOUNTER (OUTPATIENT)
Facility: HOSPITAL | Age: 43
Setting detail: OUTPATIENT SURGERY
Discharge: HOME | End: 2023-02-10
Attending: PODIATRIST | Admitting: PODIATRIST

## 2023-02-10 VITALS
HEART RATE: 90 BPM | DIASTOLIC BLOOD PRESSURE: 72 MMHG | BODY MASS INDEX: 47.74 KG/M2 | RESPIRATION RATE: 16 BRPM | OXYGEN SATURATION: 96 % | WEIGHT: 243.17 LBS | SYSTOLIC BLOOD PRESSURE: 121 MMHG | TEMPERATURE: 98.2 F | HEIGHT: 60 IN

## 2023-02-10 DIAGNOSIS — G89.18 ACUTE POSTOPERATIVE PAIN: ICD-10-CM

## 2023-02-10 DIAGNOSIS — T40.2X5A CONSTIPATION DUE TO OPIOID THERAPY: Primary | ICD-10-CM

## 2023-02-10 DIAGNOSIS — Z98.890 POSTOPERATIVE STATE: ICD-10-CM

## 2023-02-10 DIAGNOSIS — K59.03 CONSTIPATION DUE TO OPIOID THERAPY: Primary | ICD-10-CM

## 2023-02-10 PROBLEM — E66.01 MORBID OBESITY WITH BODY MASS INDEX (BMI) OF 45.0 TO 49.9 IN ADULT (HCC): Status: ACTIVE | Noted: 2023-02-10

## 2023-02-10 LAB
EXT PREGNANCY TEST URINE: NEGATIVE
EXT. CONTROL: NORMAL

## 2023-02-10 RX ORDER — HYDROMORPHONE HCL/PF 1 MG/ML
0.5 SYRINGE (ML) INJECTION
Status: DISCONTINUED | OUTPATIENT
Start: 2023-02-10 | End: 2023-02-10 | Stop reason: HOSPADM

## 2023-02-10 RX ORDER — OXYCODONE HYDROCHLORIDE 5 MG/1
5 TABLET ORAL EVERY 4 HOURS PRN
Status: DISCONTINUED | OUTPATIENT
Start: 2023-02-10 | End: 2023-02-10 | Stop reason: HOSPADM

## 2023-02-10 RX ORDER — MIDAZOLAM HYDROCHLORIDE 2 MG/2ML
INJECTION, SOLUTION INTRAMUSCULAR; INTRAVENOUS AS NEEDED
Status: DISCONTINUED | OUTPATIENT
Start: 2023-02-10 | End: 2023-02-10

## 2023-02-10 RX ORDER — OXYCODONE HYDROCHLORIDE AND ACETAMINOPHEN 5; 325 MG/1; MG/1
1 TABLET ORAL EVERY 6 HOURS PRN
Qty: 16 TABLET | Refills: 0 | Status: SHIPPED | OUTPATIENT
Start: 2023-02-10 | End: 2023-02-20

## 2023-02-10 RX ORDER — CEFAZOLIN SODIUM 2 G/50ML
2000 SOLUTION INTRAVENOUS ONCE
Status: COMPLETED | OUTPATIENT
Start: 2023-02-10 | End: 2023-02-10

## 2023-02-10 RX ORDER — MAGNESIUM HYDROXIDE 1200 MG/15ML
LIQUID ORAL AS NEEDED
Status: DISCONTINUED | OUTPATIENT
Start: 2023-02-10 | End: 2023-02-10 | Stop reason: HOSPADM

## 2023-02-10 RX ORDER — ACETAMINOPHEN 325 MG/1
650 TABLET ORAL EVERY 4 HOURS PRN
Status: DISCONTINUED | OUTPATIENT
Start: 2023-02-10 | End: 2023-02-10 | Stop reason: HOSPADM

## 2023-02-10 RX ORDER — ONDANSETRON 2 MG/ML
INJECTION INTRAMUSCULAR; INTRAVENOUS AS NEEDED
Status: DISCONTINUED | OUTPATIENT
Start: 2023-02-10 | End: 2023-02-10

## 2023-02-10 RX ORDER — PROPOFOL 10 MG/ML
INJECTION, EMULSION INTRAVENOUS AS NEEDED
Status: DISCONTINUED | OUTPATIENT
Start: 2023-02-10 | End: 2023-02-10

## 2023-02-10 RX ORDER — DOCUSATE SODIUM 100 MG/1
100 CAPSULE, LIQUID FILLED ORAL 2 TIMES DAILY
Qty: 8 CAPSULE | Refills: 0 | Status: SHIPPED | OUTPATIENT
Start: 2023-02-10

## 2023-02-10 RX ORDER — MEPERIDINE HYDROCHLORIDE 25 MG/ML
12.5 INJECTION INTRAMUSCULAR; INTRAVENOUS; SUBCUTANEOUS
Status: DISCONTINUED | OUTPATIENT
Start: 2023-02-10 | End: 2023-02-10 | Stop reason: HOSPADM

## 2023-02-10 RX ORDER — ONDANSETRON 2 MG/ML
4 INJECTION INTRAMUSCULAR; INTRAVENOUS ONCE AS NEEDED
Status: DISCONTINUED | OUTPATIENT
Start: 2023-02-10 | End: 2023-02-10 | Stop reason: HOSPADM

## 2023-02-10 RX ORDER — DEXAMETHASONE SODIUM PHOSPHATE 10 MG/ML
INJECTION, SOLUTION INTRAMUSCULAR; INTRAVENOUS AS NEEDED
Status: DISCONTINUED | OUTPATIENT
Start: 2023-02-10 | End: 2023-02-10

## 2023-02-10 RX ORDER — ONDANSETRON 2 MG/ML
4 INJECTION INTRAMUSCULAR; INTRAVENOUS EVERY 6 HOURS PRN
Status: DISCONTINUED | OUTPATIENT
Start: 2023-02-10 | End: 2023-02-10 | Stop reason: HOSPADM

## 2023-02-10 RX ORDER — SODIUM CHLORIDE, SODIUM LACTATE, POTASSIUM CHLORIDE, CALCIUM CHLORIDE 600; 310; 30; 20 MG/100ML; MG/100ML; MG/100ML; MG/100ML
125 INJECTION, SOLUTION INTRAVENOUS CONTINUOUS
Status: DISCONTINUED | OUTPATIENT
Start: 2023-02-10 | End: 2023-02-10 | Stop reason: HOSPADM

## 2023-02-10 RX ORDER — FENTANYL CITRATE/PF 50 MCG/ML
25 SYRINGE (ML) INJECTION
Status: DISCONTINUED | OUTPATIENT
Start: 2023-02-10 | End: 2023-02-10 | Stop reason: HOSPADM

## 2023-02-10 RX ORDER — LIDOCAINE HYDROCHLORIDE 20 MG/ML
INJECTION, SOLUTION EPIDURAL; INFILTRATION; INTRACAUDAL; PERINEURAL AS NEEDED
Status: DISCONTINUED | OUTPATIENT
Start: 2023-02-10 | End: 2023-02-10

## 2023-02-10 RX ORDER — FENTANYL CITRATE 50 UG/ML
INJECTION, SOLUTION INTRAMUSCULAR; INTRAVENOUS AS NEEDED
Status: DISCONTINUED | OUTPATIENT
Start: 2023-02-10 | End: 2023-02-10

## 2023-02-10 RX ORDER — BUPIVACAINE HYDROCHLORIDE 5 MG/ML
INJECTION, SOLUTION EPIDURAL; INTRACAUDAL AS NEEDED
Status: DISCONTINUED | OUTPATIENT
Start: 2023-02-10 | End: 2023-02-10 | Stop reason: HOSPADM

## 2023-02-10 RX ADMIN — LIDOCAINE HYDROCHLORIDE 100 MG: 20 INJECTION, SOLUTION EPIDURAL; INFILTRATION; INTRACAUDAL; PERINEURAL at 13:52

## 2023-02-10 RX ADMIN — CEFAZOLIN SODIUM 2000 MG: 2 SOLUTION INTRAVENOUS at 13:50

## 2023-02-10 RX ADMIN — FENTANYL CITRATE 25 MCG: 50 INJECTION INTRAMUSCULAR; INTRAVENOUS at 14:01

## 2023-02-10 RX ADMIN — MIDAZOLAM 2 MG: 1 INJECTION INTRAMUSCULAR; INTRAVENOUS at 13:50

## 2023-02-10 RX ADMIN — DEXAMETHASONE SODIUM PHOSPHATE 5 MG: 10 INJECTION INTRAMUSCULAR; INTRAVENOUS at 13:56

## 2023-02-10 RX ADMIN — FENTANYL CITRATE 50 MCG: 50 INJECTION INTRAMUSCULAR; INTRAVENOUS at 14:46

## 2023-02-10 RX ADMIN — PROPOFOL 200 MG: 10 INJECTION, EMULSION INTRAVENOUS at 13:52

## 2023-02-10 RX ADMIN — SODIUM CHLORIDE, SODIUM LACTATE, POTASSIUM CHLORIDE, AND CALCIUM CHLORIDE 125 ML/HR: .6; .31; .03; .02 INJECTION, SOLUTION INTRAVENOUS at 11:10

## 2023-02-10 RX ADMIN — ONDANSETRON 4 MG: 2 INJECTION INTRAMUSCULAR; INTRAVENOUS at 13:56

## 2023-02-10 RX ADMIN — FENTANYL CITRATE 25 MCG: 50 INJECTION INTRAMUSCULAR; INTRAVENOUS at 13:58

## 2023-02-10 NOTE — DISCHARGE INSTR - AVS FIRST PAGE
Dr La Briones Instructions    1  Take your prescribed medication as directed  2  Upon arrival at home, lie down and elevate your surgical foot on 2 pillows  3  Remain quiet, off your feet as much as possible, for the first 24-48 hours  This is when your feet first swell and may become painful  After 48 hours you may begin limited walking following these restrictions:   Weightbear as tolerated to surgical foot with use of crutches for assistance and while wearing surgical shoe or cam walker at all times  4  Drink large quantities of water and citrus fruit juice  Consume no alcohol  Continue a well-balanced diet  5  Report any unusual discomfort or fever to this office  6  A limited amount of discomfort and swelling is to be expected  In some cases the skin may take on a bruised appearance  The surgical solution that was applied to your foot prior to the operation is dark in color and the operation site may appear to be oozing when it actually is not  7  A slight amount of blood is to be expected, and is no cause for alarm  Do not remove the dressings  If there is active bleeding and if the bleeding persists, add additional gauze to the bandage, apply direct pressure, elevate your feet and call this office  8  Do not get the dressings wet  As regular bathing may be inconvenient, sponge baths are recommended  9  When anesthesia wears off and if any discomfort should be present, apply an ice pack directly over the operated area for 15 minute intervals for several hours or until the pain leaves  (USE IN EXCESS OF 15 MINUTES COULD CAUSE FROSTBITE)  Do not use hot water bags or electric pads  A convenient icepack can be made by placing ice cubes in a plastic bag and covering this with a towel  10  If necessary, take a mild laxative before retiring  11  Wear your special open shoes anytime you put weight on your foot, even if it is just to walk to the bathroom and back   It will probably be 2 or 3 weeks before you will be permitted to try regular shoes  12  Having performed the operation, we are interested in a prompt recovery  Please cooperate by following the above instructions  13  Please call to confirm your post-op appointment or call with any other questions

## 2023-02-10 NOTE — ANESTHESIA POSTPROCEDURE EVALUATION
Post-Op Assessment Note    CV Status:  Stable  Pain Score: 0    Pain management: adequate     Mental Status:  Alert and awake   Hydration Status:  Euvolemic   PONV Controlled:  Controlled   Airway Patency:  Patent   Two or more mitigation strategies used for obstructive sleep apnea   Post Op Vitals Reviewed: Yes      Staff: Anesthesiologist, CRNA         No notable events documented      BP      Temp      Pulse     Resp      SpO2      /72   Pulse 86   Temp 97 6 °F (36 4 °C)   Resp 16   Ht 5' (1 524 m)   Wt 110 kg (243 lb 2 7 oz)   LMP 02/10/2023   SpO2 95%   BMI 47 49 kg/m²

## 2023-02-10 NOTE — ANESTHESIA PREPROCEDURE EVALUATION
Procedure:  REV PFR decomp of distal tibial, medl/lat plantar as well as Santiago's nerve release (Right: Foot)    Relevant Problems   NEURO/PSYCH   (+) Anxiety      Other   (+) Morbid obesity with body mass index (BMI) of 45 0 to 49 9 in adult [facility])        Physical Exam    Airway    Mallampati score: III  TM Distance: >3 FB  Neck ROM: full     Dental   No notable dental hx     Cardiovascular  Rhythm: regular, Cardiovascular exam normal    Pulmonary  Pulmonary exam normal Breath sounds clear to auscultation,     Other Findings        Anesthesia Plan  ASA Score- 3     Anesthesia Type- general with ASA Monitors  Additional Monitors:   Airway Plan: ETT  Plan Factors-    Chart reviewed  Existing labs reviewed  Patient summary reviewed  Patient is not a current smoker  Patient not instructed to abstain from smoking on day of procedure  Patient did not smoke on day of surgery  Induction- intravenous  Postoperative Plan- Plan for postoperative opioid use  Planned trial extubation    Informed Consent- Anesthetic plan and risks discussed with patient and spouse

## 2023-02-10 NOTE — DISCHARGE SUMMARY
Discharge Summary Outpatient Procedure Podiatry -   Ada Suarez 38 y o  female MRN: [id number]  Unit/Bed#: OR [place] Encounter: [id number]    Admission Date: 2/10/2023     Admitting Diagnosis: Right foot pain [M79 671]    Discharge Diagnosis: same    Procedure(s) (LRB):  1) distal tarsal tunnel release  2) plantar fasciotomy  3) Baxters nerve release    Complications: none    Condition at Discharge: stable    Discharge instructions/Information to patient and family:   See after visit summary for information provided to patient and family  Provisions for Follow-Up Care/Important appointments:  See after visit summary for information related to follow-up care and any pertinent home health orders  Discharge Medications:  See after visit summary for reconciled discharge medications provided to patient and family 
normal bilaterally

## 2023-02-10 NOTE — OP NOTE
OPERATIVE REPORT - Podiatry  PATIENT NAME: Daniel Pedraza    :  1980  MRN: [id number]  Pt Location: [facility] OR ROOM 01    SURGERY DATE: 2/10/2023    Surgeon(s) and Role:     * Katina Mcgregor DPM - Primary     * Deidre Fabian DPM - Assisting    Pre-op Diagnosis:  Right heel pain    Post-Op Diagnosis Codes:     * Right heel pain    Procedure(s) (LRB):  1) distal tarsal tunnel release  2) plantar fasciotomy  3) Baxters nerve release    Specimen(s):  * No specimens in log *    Estimated Blood Loss:   Minimal    Drains:  * No LDAs found *    Anesthesia Type:   Choice with 20 ml of 1% Lidocaine and 0 5% Bupivacaine in a 1:1 mixture    Hemostasis:  -18 inch pneumatic calf tourniquet inflated to 250 mmHg for 34 minutes  -Bipolar electrocautery  -Atraumatic technique    Materials:  * No implants in log *  -3-0 Vicryl  -3-0 Prolene  -3-0 nylon     Operative Findings:  Consistent with diagnosis  Entrapment and flattening tibial nerve was visualized  Hypertrophy of the plantar fascia was noted  Complications:   None     Procedure and Technique:      Under mild sedation, the patient was brought into the operating room and placed on the operating room table in the supine position  A pneumatic tourniquet was then placed around the patient's right lower extremity with ample webril padding  A time out was performed to confirm the correct patient, procedure and site with all parties in agreement  Following IV sedation, a field block was performed consisting of 20 ml of 1% Lidocaine and 0 5% Bupivacaine in a 1:1 mixture left heel  The limb was then scrubbed, prepped and draped in the usual aseptic manner  An esmarch bandage was utilized to exsangunate the patients foot and the tourniquet was then inflated  The esmarch bandage was removed and the foot was placed on the operating room table        Attention was directed to the right medial ankle where approximately 4 cm linear incision was created from the lancinate ligament and over the lisette pedis over posterior tibial nerve course  Blunt dissection was carried through subcutaneous tissue  Bipolar cautery was utilized to cauterize all bleeders and subcutaneous tissues as necessary  The distal tibial nerve was decompressed  At this time the incision was extended distal plantar to the abductor hallucis along the neurovascular structures  Blunt dissection was used then bipolar of vessels for hemostasis  A rongeur was used to remove lipomatous tissue  Once dissection performed down to level of the abductor hallucis muscle belly, both the deep fascia between the abductor hallucis, quadratus plantae and FDB was incised  The incision was now extended below the foot to the central band of the plantar aponeurosis  Rongeur was used to remove redundant lipomatous tissue thus exposing the plantar fascia  Via sharp means the plantar fascia was incised and a portion was removed for fasciectomy of medial and central portions  With deep plantar blunt dissection the first branch from lateral plantar nerve (Santiago's nerve) was visualized and freed from surrounding tissue thus performing decompression  The surgical incision was irrigated with copious amounts of normal sterile saline  A small finger was able to be placed up and down the entire decompression site  After thorough bipolar cauterization of remaining vessels the subcutaneous tissue was reapproximated utilizing 3-0 Vicryl, skin edges were reapproximated utilizing 3-0 prolene and 3-0 nylon  The foot was then cleansed and dried  The incision sites were dressed with Xeroform, 4x4 gauze  This was then covered with a webril, Coban and an ACE wrap  The tourniquet was deflated and normal hyperemic flush was noted to all digits  The patient tolerated the procedure and anesthesia well and was transported to the PACU with vital signs stable        Dr Johnny Meza was present during the entire procedure and participated in all key aspects  CHUCK LeighM  DATE: February 10, 2023  TIME: 2:59 PM      Portions of the record may have been created with voice recognition software  Occasional wrong word or "sound a like" substitutions may have occurred due to the inherent limitations of voice recognition software  Read the chart carefully and recognize, using context, where substitutions have occurred

## 2023-02-17 NOTE — PROGRESS NOTES
It's is a partial fasciectomy                                    HIM - PROCEDURE RESPONSE NOTE    Based on the query that was sent to you, please document below any procedures that were performed

## 2023-09-26 ENCOUNTER — APPOINTMENT (EMERGENCY)
Dept: RADIOLOGY | Facility: HOSPITAL | Age: 43
End: 2023-09-26
Payer: COMMERCIAL

## 2023-09-26 ENCOUNTER — HOSPITAL ENCOUNTER (EMERGENCY)
Facility: HOSPITAL | Age: 43
Discharge: HOME | End: 2023-09-26
Attending: EMERGENCY MEDICINE
Payer: COMMERCIAL

## 2023-09-26 VITALS
TEMPERATURE: 98.5 F | RESPIRATION RATE: 18 BRPM | DIASTOLIC BLOOD PRESSURE: 62 MMHG | OXYGEN SATURATION: 99 % | HEART RATE: 69 BPM | SYSTOLIC BLOOD PRESSURE: 119 MMHG

## 2023-09-26 DIAGNOSIS — U07.1 COVID-19: Primary | ICD-10-CM

## 2023-09-26 LAB
EXT PREGNANCY TEST URINE: NEGATIVE
EXT. CONTROL: NORMAL

## 2023-09-26 PROCEDURE — 99284 EMERGENCY DEPT VISIT MOD MDM: CPT | Performed by: EMERGENCY MEDICINE

## 2023-09-26 PROCEDURE — 71046 X-RAY EXAM CHEST 2 VIEWS: CPT

## 2023-09-26 PROCEDURE — 81025 URINE PREGNANCY TEST: CPT | Performed by: EMERGENCY MEDICINE

## 2023-09-26 PROCEDURE — 96372 THER/PROPH/DIAG INJ SC/IM: CPT

## 2023-09-26 PROCEDURE — 99283 EMERGENCY DEPT VISIT LOW MDM: CPT

## 2023-09-26 RX ORDER — HYDROMORPHONE HCL/PF 1 MG/ML
1 SYRINGE (ML) INJECTION ONCE
Status: DISCONTINUED | OUTPATIENT
Start: 2023-09-26 | End: 2023-09-26

## 2023-09-26 RX ORDER — KETOROLAC TROMETHAMINE 30 MG/ML
15 INJECTION, SOLUTION INTRAMUSCULAR; INTRAVENOUS ONCE
Status: COMPLETED | OUTPATIENT
Start: 2023-09-26 | End: 2023-09-26

## 2023-09-26 RX ORDER — LIDOCAINE 50 MG/G
2 PATCH TOPICAL ONCE
Status: DISCONTINUED | OUTPATIENT
Start: 2023-09-26 | End: 2023-09-26 | Stop reason: HOSPADM

## 2023-09-26 RX ADMIN — LIDOCAINE 2 PATCH: 50 PATCH TOPICAL at 11:17

## 2023-09-26 RX ADMIN — KETOROLAC TROMETHAMINE 15 MG: 30 INJECTION, SOLUTION INTRAMUSCULAR; INTRAVENOUS at 11:16

## 2023-09-26 NOTE — ED PROVIDER NOTES
HPI: Patient is a 38 y.o. female who presents with 2 weeks of cough which the patient describes at mild The patient has had contact with people with similar symptoms. The patient has not taken any medication. Home test positive 4 days ago. Lateral chest wall pain with coughing. Allergies   Allergen Reactions   • Penicillins Other (See Comments)     As a child       Past Medical History:   Diagnosis Date   • Anxiety    • Chronic back pain    • Chronic pain    • COVID 02/2022   • Myalgia    • Ovarian cyst    • Spinal stenosis       Past Surgical History:   Procedure Laterality Date   • BACK SURGERY     • LAMINECTOMY     • NJ FASCIECTOMY PLANTAR FASCIA PARTIAL SPX Right 2/10/2023    Procedure: REV PFR decomp of distal tibial, medl/lat plantar as well as Santiago's nerve release;  Surgeon: Adrienne Ramirez DPM;  Location: [facility];  Service: Podiatry   • NJ LAPS ABD PRTM&OMENTUM DX W/WO SPEC BR/WA SPX N/A 07/22/2017    Procedure: LAPAROSCOPY DIAGNOSTIC;  Surgeon: Tori Petersen MD;  Location:  MAIN OR;  Service: Gynecology   • TUBAL LIGATION       Social History     Tobacco Use   • Smoking status: Former   Vaping Use   • Vaping Use: Never used   Substance Use Topics   • Alcohol use: Yes     Comment: rarely   • Drug use: Yes     Types: Marijuana     Comment: Medical not currently using       Nursing notes reviewed  Physical Exam:  ED Triage Vitals [09/26/23 1024]   Temperature Pulse Respirations Blood Pressure SpO2   98.5 °F (36.9 °C) 85 20 137/87 (S) 100 %      Temp Source Heart Rate Source Patient Position - Orthostatic VS BP Location FiO2 (%)   Temporal Monitor Sitting Right arm --      Pain Score       5           ROS: Positive for cough, the remainder of a 10 organ system ROS was otherwise unremarkable.   General: awake, alert, no acute distress    Head: normocephalic, atraumatic    Eyes: no scleral icterus  Ears: external ears normal, hearing grossly intact  Nose: external exam grossly normal, negative nasal discharge  Neck: symmetric, No JVD noted, trachea midline  Pulmonary: no respiratory distress, no tachypnea noted  Cardiovascular: appears well perfused  Abdomen: no distention noted  Musculoskeletal: no deformities noted, tone normal  Neuro: grossly non-focal  Psych: mood and affect appropriate    The patient is stable and has a history and physical exam consistent with a viral illness. COVID19 testing has not been performed. I considered the patient's other medical conditions as applicable/noted above in my medical decision making. The patient is stable upon discharge. The plan is for supportive care at home. The patient (and any family present) verbalized understanding of the discharge instructions and warnings that would necessitate return to the Emergency Department. All questions were answered prior to discharge. Medications   lidocaine (LIDODERM) 5 % patch 2 patch (2 patches Topical Medication Applied 9/26/23 1117)   ketorolac (TORADOL) injection 15 mg (15 mg Intramuscular Given 9/26/23 1116)     Final diagnoses:   COVID-19     Time reflects when diagnosis was documented in both MDM as applicable and the Disposition within this note     Time User Action Codes Description Comment    9/26/2023 12:11 PM [facility] [address] [place] [address] [U07.1] COVID-19       ED Disposition     ED Disposition   Discharge    Condition   Stable    Date/Time   Tue Sep 26, 2023 12:10 PM    Comment   [facility] [place] discharge to home/self care.                Follow-up Information     Follow up With Specialties Details Why Contact Info Additional [facility], [address] [place] [address] Nurse Practitioner   [facility] [phone] [facility] Emergency Department Emergency Medicine  If symptoms worsen [address] [facility] [phone]  [address] [facility] Emergency Department, [address] [facility], [facility], [address] [facility],[address] Discharge Medication List as of 9/26/2023 12:42 PM      CONTINUE these medications which have NOT CHANGED    Details   butalbital-acetaminophen-caffeine (FIORICET,ESGIC) -40 mg per tablet Take by mouth, Historical Med      citalopram (CeleXA) 10 mg tablet Take 10 mg by mouth daily, Historical Med      cyclobenzaprine (FLEXERIL) 10 mg tablet Take 10 mg by mouth 3 (three) times a day as needed for muscle spasms, Historical Med      docusate sodium (COLACE) 100 mg capsule Take 1 capsule (100 mg total) by mouth 2 (two) times a day, Starting Fri 2/10/2023, Normal      hydrOXYzine HCL (ATARAX) 10 mg tablet Take 10 mg by mouth as needed for itching, Historical Med      methylPREDNISolone 4 MG tablet therapy pack Use as directed on package, Normal           No discharge procedures on file.     Electronically Signed by       Ning Pfeiffer DO  09/26/23 [id number]

## 2023-09-26 NOTE — ED NOTES
Patient reports inability to provide urine sample at this time.      Serenity Nunez RN  09/26/23 [id number]

## 2024-01-17 DIAGNOSIS — Z00.6 ENCOUNTER FOR EXAMINATION FOR NORMAL COMPARISON OR CONTROL IN CLINICAL RESEARCH PROGRAM: ICD-10-CM

## 2024-10-23 ENCOUNTER — LAB (OUTPATIENT)
Dept: LAB | Facility: MEDICAL CENTER | Age: 44
End: 2024-10-23
Payer: COMMERCIAL

## 2024-10-23 ENCOUNTER — OFFICE VISIT (OUTPATIENT)
Dept: GASTROENTEROLOGY | Facility: MEDICAL CENTER | Age: 44
End: 2024-10-23
Payer: COMMERCIAL

## 2024-10-23 VITALS
DIASTOLIC BLOOD PRESSURE: 80 MMHG | BODY MASS INDEX: 45.47 KG/M2 | WEIGHT: 232.8 LBS | HEART RATE: 77 BPM | TEMPERATURE: 97.6 F | SYSTOLIC BLOOD PRESSURE: 116 MMHG

## 2024-10-23 DIAGNOSIS — E66.01 MORBID OBESITY WITH BODY MASS INDEX (BMI) OF 45.0 TO 49.9 IN ADULT (HCC): ICD-10-CM

## 2024-10-23 DIAGNOSIS — K30 STOMACH BURNING: ICD-10-CM

## 2024-10-23 DIAGNOSIS — R93.5 ABNORMAL CT OF THE ABDOMEN: ICD-10-CM

## 2024-10-23 DIAGNOSIS — Z00.6 ENCOUNTER FOR EXAMINATION FOR NORMAL COMPARISON OR CONTROL IN CLINICAL RESEARCH PROGRAM: ICD-10-CM

## 2024-10-23 DIAGNOSIS — Z87.19 HX OF DIVERTICULITIS OF COLON: ICD-10-CM

## 2024-10-23 DIAGNOSIS — K30 STOMACH BURNING: Primary | ICD-10-CM

## 2024-10-23 LAB
ALBUMIN SERPL BCG-MCNC: 3.9 G/DL (ref 3.5–5)
ALP SERPL-CCNC: 67 U/L (ref 34–104)
ALT SERPL W P-5'-P-CCNC: 13 U/L (ref 7–52)
ANION GAP SERPL CALCULATED.3IONS-SCNC: 7 MMOL/L (ref 4–13)
AST SERPL W P-5'-P-CCNC: 12 U/L (ref 13–39)
BASOPHILS # BLD AUTO: 0.03 THOUSANDS/ΜL (ref 0–0.1)
BASOPHILS NFR BLD AUTO: 0 % (ref 0–1)
BILIRUB DIRECT SERPL-MCNC: 0.1 MG/DL (ref 0–0.2)
BILIRUB SERPL-MCNC: 0.39 MG/DL (ref 0.2–1)
BUN SERPL-MCNC: 9 MG/DL (ref 5–25)
CALCIUM SERPL-MCNC: 9.4 MG/DL (ref 8.4–10.2)
CHLORIDE SERPL-SCNC: 104 MMOL/L (ref 96–108)
CO2 SERPL-SCNC: 31 MMOL/L (ref 21–32)
CREAT SERPL-MCNC: 0.81 MG/DL (ref 0.6–1.3)
EOSINOPHIL # BLD AUTO: 0.13 THOUSAND/ΜL (ref 0–0.61)
EOSINOPHIL NFR BLD AUTO: 2 % (ref 0–6)
ERYTHROCYTE [DISTWIDTH] IN BLOOD BY AUTOMATED COUNT: 11.4 % (ref 11.6–15.1)
GFR SERPL CREATININE-BSD FRML MDRD: 90 ML/MIN/1.73SQ M
GLUCOSE P FAST SERPL-MCNC: 96 MG/DL (ref 65–99)
HCT VFR BLD AUTO: 42.3 % (ref 34.8–46.1)
HGB BLD-MCNC: 14.6 G/DL (ref 11.5–15.4)
IMM GRANULOCYTES # BLD AUTO: 0.03 THOUSAND/UL (ref 0–0.2)
IMM GRANULOCYTES NFR BLD AUTO: 0 % (ref 0–2)
LYMPHOCYTES # BLD AUTO: 1.58 THOUSANDS/ΜL (ref 0.6–4.47)
LYMPHOCYTES NFR BLD AUTO: 20 % (ref 14–44)
MCH RBC QN AUTO: 31.8 PG (ref 26.8–34.3)
MCHC RBC AUTO-ENTMCNC: 34.5 G/DL (ref 31.4–37.4)
MCV RBC AUTO: 92 FL (ref 82–98)
MONOCYTES # BLD AUTO: 0.66 THOUSAND/ΜL (ref 0.17–1.22)
MONOCYTES NFR BLD AUTO: 8 % (ref 4–12)
NEUTROPHILS # BLD AUTO: 5.49 THOUSANDS/ΜL (ref 1.85–7.62)
NEUTS SEG NFR BLD AUTO: 70 % (ref 43–75)
NRBC BLD AUTO-RTO: 0 /100 WBCS
PLATELET # BLD AUTO: 238 THOUSANDS/UL (ref 149–390)
PMV BLD AUTO: 8.9 FL (ref 8.9–12.7)
POTASSIUM SERPL-SCNC: 4.2 MMOL/L (ref 3.5–5.3)
PROT SERPL-MCNC: 7.1 G/DL (ref 6.4–8.4)
RBC # BLD AUTO: 4.59 MILLION/UL (ref 3.81–5.12)
SODIUM SERPL-SCNC: 142 MMOL/L (ref 135–147)
WBC # BLD AUTO: 7.92 THOUSAND/UL (ref 4.31–10.16)

## 2024-10-23 PROCEDURE — 80076 HEPATIC FUNCTION PANEL: CPT

## 2024-10-23 PROCEDURE — 80048 BASIC METABOLIC PNL TOTAL CA: CPT

## 2024-10-23 PROCEDURE — 36415 COLL VENOUS BLD VENIPUNCTURE: CPT

## 2024-10-23 PROCEDURE — 99204 OFFICE O/P NEW MOD 45 MIN: CPT | Performed by: INTERNAL MEDICINE

## 2024-10-23 PROCEDURE — 85025 COMPLETE CBC W/AUTO DIFF WBC: CPT

## 2024-10-23 RX ORDER — OMEPRAZOLE 40 MG/1
40 CAPSULE, DELAYED RELEASE ORAL DAILY
Qty: 30 CAPSULE | Refills: 2 | Status: SHIPPED | OUTPATIENT
Start: 2024-10-23

## 2024-10-23 RX ORDER — METHOCARBAMOL 750 MG/1
750 TABLET, FILM COATED ORAL EVERY 6 HOURS PRN
COMMUNITY

## 2024-10-23 NOTE — PROGRESS NOTES
[facility] - Outpatient Consultation  Kaur Lorenzo 43 y.o. female MRN: [id number]  Encounter: [id number]          ASSESSMENT AND PLAN:      1. Stomach burning  Patient's symptoms are atypical for acid reflux.  Her burning sensation started in the left lower quadrant for which she had history of diverticulitis, stool test to rule out H. pylori infection, trial of PPI for dyspepsia.  - H. pylori antigen, stool; Future  - omeprazole (PriLOSEC) 40 MG capsule; Take 1 capsule (40 mg total) by mouth daily  Dispense: 30 capsule; Refill: 2    2. Hx of diverticulitis of colon  Patient has intermittent left lower quadrant abdominal pain.  She had history of diverticulitis but was not treated for.  It could be possible patient has a condition called a segmental colitis associated with diverticulosis.    3. Abnormal CT of the abdomen  Plan to repeat a CT scan with IV contrast to evaluate for diverticulitis.  If CT scan is positive for diverticulitis, we will initiate a course of antibiotics followed by colonoscopy in 6 to 8 weeks.  If CT scan is negative we will follow her up in 3 months after taking PPI, if symptoms are persistent, we will consider to do an outpatient upper endoscopy.  - CT abdomen pelvis w contrast; Future  - CBC and differential; Future  - Basic metabolic panel; Future  - Hepatic function panel; Future    4. Morbid obesity with body mass index (BMI) of 45.0 to 49.9 in adult (HCC)  Patient was counseled on importance of weight loss.  Patient is to discuss with PCP regarding GLP-1 agonist use.    ______________________________________________________________________    HPI: 43-year-old female presented for evaluation of stomach burning.  Patient reported in past few months she has been feeling left lower abdominal burning sensation traveling into the upper left quadrant intermittently.  Patient reported her pain is not correlated with food intake.  She also has chronic back pain  so she could not tell whether the pain radiates to her back.  She underwent a CAT scan in June 2024 showing mild diverticulitis.  She was not treated with antibiotics at that time.  She reported regular bowel movement every day with sense of complete evacuation.  She reported her stool has become loose in the past few months.  She denies blood in the stool.      REVIEW OF SYSTEMS:    CONSTITUTIONAL: Denies any fever, chills, rigors, and weight loss.  HEENT: No earache or tinnitus. Denies hearing loss or visual disturbances.  CARDIOVASCULAR: No chest pain or palpitations.   RESPIRATORY: Denies any cough, hemoptysis, shortness of breath or dyspnea on exertion.  GASTROINTESTINAL: As noted in the History of Present Illness.   GENITOURINARY: No problems with urination. Denies any hematuria or dysuria.  NEUROLOGIC: No dizziness or vertigo, denies headaches.   MUSCULOSKELETAL: Denies any muscle or joint pain.   SKIN: Denies skin rashes or itching.   ENDOCRINE: Denies excessive thirst. Denies intolerance to heat or cold.  PSYCHOSOCIAL: Denies depression or anxiety. Denies any recent memory loss.       Historical Information   Past Medical History:   Diagnosis Date    Anxiety     Chronic back pain     Chronic pain     COVID 02/2022    Myalgia     Ovarian cyst     Spinal stenosis      Past Surgical History:   Procedure Laterality Date    BACK SURGERY      LAMINECTOMY      VA FASCIECTOMY PLANTAR FASCIA PARTIAL SPX Right 2/10/2023    Procedure: REV PFR decomp of distal tibial, medl/lat plantar as well as Santiago's nerve release;  Surgeon: Skip Bello DPM;  Location: [facility];  Service: Podiatry    VA LAPS ABD PRTM&OMENTUM DX W/WO SPEC BR/WA SPX N/A 07/22/2017    Procedure: LAPAROSCOPY DIAGNOSTIC;  Surgeon: Fran Reynoso MD;  Location:  MAIN OR;  Service: Gynecology    TUBAL LIGATION       Social History   Social History     Substance and Sexual Activity   Alcohol Use Yes    Comment: rarely     Social History      Substance and Sexual Activity   Drug Use Yes    Types: Marijuana    Comment: Medical not currently using     Social History     Tobacco Use   Smoking Status Former   Smokeless Tobacco Not on file     History reviewed. No pertinent family history.    Meds/Allergies       Current Outpatient Medications:     methocarbamol (ROBAXIN) 750 mg tablet    omeprazole (PriLOSEC) 40 MG capsule    butalbital-acetaminophen-caffeine (FIORICET,ESGIC) -40 mg per tablet    citalopram (CeleXA) 10 mg tablet    cyclobenzaprine (FLEXERIL) 10 mg tablet    docusate sodium (COLACE) 100 mg capsule    hydrOXYzine HCL (ATARAX) 10 mg tablet    methylPREDNISolone 4 MG tablet therapy pack    Allergies   Allergen Reactions    Penicillins Other (See Comments)     As a child           Objective     Blood pressure 116/80, pulse 77, temperature 97.6 °F (36.4 °C), weight 106 kg (232 lb 12.8 oz), unknown if currently breastfeeding. Body mass index is 45.47 kg/m².        PHYSICAL EXAM:      General Appearance:   Alert, cooperative, no distress   HEENT:   Normocephalic, atraumatic, anicteric.     Neck:  Supple, symmetrical, trachea midline   Lungs:   Clear to auscultation bilaterally; no rales, rhonchi or wheezing; respirations unlabored    Heart::   Regular rate and rhythm; no murmur, rub, or gallop.   Abdomen:   Soft, non-tender, non-distended; normal bowel sounds; no masses, no organomegaly    Genitalia:   Deferred    Rectal:   Deferred    Extremities:  No cyanosis, clubbing or edema    Pulses:  2+ and symmetric    Skin:  No jaundice, rashes, or lesions    Lymph nodes:  No palpable cervical lymphadenopathy        Lab Results:   No visits with results within 1 Day(s) from this visit.   Latest known visit with results is:   Admission on 09/26/2023, Discharged on 09/26/2023   Component Date Value    EXT Preg Test, Ur 09/26/2023 Negative     Control 09/26/2023 Valid          Radiology Results:   No results found.

## 2024-10-30 ENCOUNTER — HOSPITAL ENCOUNTER (OUTPATIENT)
Dept: CT IMAGING | Facility: HOSPITAL | Age: 44
Discharge: HOME | End: 2024-10-30
Attending: INTERNAL MEDICINE
Payer: COMMERCIAL

## 2024-10-30 DIAGNOSIS — R93.5 ABNORMAL CT OF THE ABDOMEN: ICD-10-CM

## 2024-10-30 PROCEDURE — 74177 CT ABD & PELVIS W/CONTRAST: CPT

## 2024-10-30 RX ADMIN — IOHEXOL 100 ML: 350 INJECTION, SOLUTION INTRAVENOUS at 17:43

## 2024-10-31 ENCOUNTER — TELEPHONE (OUTPATIENT)
Age: 44
End: 2024-10-31

## 2024-10-31 NOTE — TELEPHONE ENCOUNTER
"LOV:10/23/24    HX:Stomach burning, Hx of diverticulitis of colon, Abnormal CT of the abdomen        Pt calling for CT results she received via [email or web address].  Concerned about it showing \"enlarged liver.\" Advised will reach out to provider to review.    Please advise.    "

## 2024-11-01 LAB
APOB+LDLR+PCSK9 GENE MUT ANL BLD/T: NOT DETECTED
BRCA1+BRCA2 DEL+DUP + FULL MUT ANL BLD/T: NOT DETECTED
MLH1+MSH2+MSH6+PMS2 GN DEL+DUP+FUL M: NOT DETECTED

## 2024-11-05 NOTE — RESULT ENCOUNTER NOTE
CT showed hepatomegaly. Recent blood work on 10/23 showed normal liver enzymes. Patient has no signs of diverticulitis on CT scan.

## (undated) DEVICE — SUT ETHILON 3-0 PS-1 18 IN 1663G

## (undated) DEVICE — CHLORAPREP HI-LITE 26ML ORANGE

## (undated) DEVICE — GLOVE SRG BIOGEL ECLIPSE 7.5

## (undated) DEVICE — REM POLYHESIVE ADULT PATIENT RETURN ELECTRODE: Brand: VALLEYLAB

## (undated) DEVICE — ENDOPOUCH RETRIEVER SPECIMEN RETRIEVAL BAGS: Brand: ENDOPOUCH RETRIEVER

## (undated) DEVICE — GLOVE SRG BIOGEL ORTHOPEDIC 8

## (undated) DEVICE — ABDOMINAL PAD: Brand: DERMACEA

## (undated) DEVICE — CAST PADDING 4 IN SYNTHETIC NON-STRL

## (undated) DEVICE — GLOVE INDICATOR PI UNDERGLOVE SZ 7.5 BLUE

## (undated) DEVICE — GLOVE SRG BIOGEL 6.5

## (undated) DEVICE — PLASTIC ADHESIVE BANDAGE: Brand: CURITY

## (undated) DEVICE — INTENDED FOR TISSUE SEPARATION, AND OTHER PROCEDURES THAT REQUIRE A SHARP SURGICAL BLADE TO PUNCTURE OR CUT.: Brand: BARD-PARKER ® CARBON RIB-BACK BLADES

## (undated) DEVICE — ENDOPATH XCEL BLADELESS TROCARS WITH STABILITY SLEEVES: Brand: ENDOPATH XCEL

## (undated) DEVICE — SCD SEQUENTIAL COMPRESSION COMFORT SLEEVE MEDIUM KNEE LENGTH: Brand: KENDALL SCD

## (undated) DEVICE — SUT VICRYL 3-0 PS-2 27 IN J427H

## (undated) DEVICE — SUT VICRYL 2-0 CT-2 27 IN J269H

## (undated) DEVICE — COBAN 4 IN STERILE

## (undated) DEVICE — IRRIG ENDO FLO TUBING

## (undated) DEVICE — ENDOPATH XCEL UNIVERSAL TROCAR STABLILITY SLEEVES: Brand: ENDOPATH XCEL

## (undated) DEVICE — ACE WRAP 6 IN UNSTERILE

## (undated) DEVICE — INTENDED FOR TISSUE SEPARATION, AND OTHER PROCEDURES THAT REQUIRE A SHARP SURGICAL BLADE TO PUNCTURE OR CUT.: Brand: BARD-PARKER SAFETY BLADES SIZE 11, STERILE

## (undated) DEVICE — BETHLEHEM UNIVERSAL  MIONR EXT: Brand: CARDINAL HEALTH

## (undated) DEVICE — SYRINGE 10ML LL

## (undated) DEVICE — GAUZE SPONGES,USP TYPE VII GAUZE, 12 PLY: Brand: CURITY

## (undated) DEVICE — 10FR FRAZIER SUCTION HANDLE: Brand: CARDINAL HEALTH

## (undated) DEVICE — OCCLUSIVE GAUZE STRIP,3% BISMUTH TRIBROMOPHENATE IN PETROLATUM BLEND: Brand: XEROFORM

## (undated) DEVICE — FLEXIBLE ADHESIVE BANDAGE,X-LARGE: Brand: CURITY

## (undated) DEVICE — UNIVERSAL GYN LAPAROSCOPY,KIT: Brand: CARDINAL HEALTH

## (undated) DEVICE — KERLIX BANDAGE ROLL: Brand: KERLIX

## (undated) DEVICE — 3M™ STERI-STRIP™ REINFORCED ADHESIVE SKIN CLOSURES, R1542, 1/4 IN X 1-1/2 IN (6 MM X 38 MM), 6 STRIPS/ENVELOPE: Brand: 3M™ STERI-STRIP™

## (undated) DEVICE — 2000CC GUARDIAN II: Brand: GUARDIAN

## (undated) DEVICE — NEEDLE 25G X 1 1/2

## (undated) DEVICE — NEEDLE 18 G X 1 1/2

## (undated) DEVICE — ACE WRAP 4 IN UNSTERILE

## (undated) DEVICE — GLOVE INDICATOR PI UNDERGLOVE SZ 8 BLUE

## (undated) DEVICE — BLUNT TIP LAPAROSCOPIC SEALER/DIVIDER: Brand: LIGASURE

## (undated) DEVICE — WEBRIL 6 IN UNSTERILE

## (undated) DEVICE — PLUMEPEN PRO 10FT

## (undated) DEVICE — GLOVE INDICATOR PI UNDERGLOVE SZ 6.5 BLUE